# Patient Record
Sex: FEMALE | Race: WHITE | URBAN - METROPOLITAN AREA
[De-identification: names, ages, dates, MRNs, and addresses within clinical notes are randomized per-mention and may not be internally consistent; named-entity substitution may affect disease eponyms.]

---

## 2017-03-06 RX ORDER — FLUOXETINE HYDROCHLORIDE 20 MG/1
CAPSULE ORAL
Qty: 30 CAP | Refills: 3 | Status: SHIPPED | OUTPATIENT
Start: 2017-03-06 | End: 2018-04-10 | Stop reason: ALTCHOICE

## 2017-03-14 ENCOUNTER — OFFICE VISIT (OUTPATIENT)
Dept: FAMILY MEDICINE CLINIC | Age: 37
End: 2017-03-14

## 2017-03-14 DIAGNOSIS — J01.90 ACUTE SINUSITIS, RECURRENCE NOT SPECIFIED, UNSPECIFIED LOCATION: Primary | ICD-10-CM

## 2017-03-14 DIAGNOSIS — F39 MOOD DISORDER (HCC): ICD-10-CM

## 2017-03-14 RX ORDER — CEFDINIR 300 MG/1
300 CAPSULE ORAL 2 TIMES DAILY
Qty: 20 CAP | Refills: 0 | Status: SHIPPED | OUTPATIENT
Start: 2017-03-14 | End: 2017-03-24

## 2017-03-14 RX ORDER — ARIPIPRAZOLE 2 MG/1
2 TABLET ORAL DAILY
Qty: 30 TAB | Refills: 1 | Status: SHIPPED | OUTPATIENT
Start: 2017-03-14 | End: 2018-04-10 | Stop reason: ALTCHOICE

## 2017-03-14 RX ORDER — HYDROCODONE POLISTIREX AND CHLORPHENIRAMINE POLISTIREX 10; 8 MG/5ML; MG/5ML
5 SUSPENSION, EXTENDED RELEASE ORAL
Qty: 120 ML | Refills: 0 | Status: SHIPPED | OUTPATIENT
Start: 2017-03-14 | End: 2017-05-19 | Stop reason: ALTCHOICE

## 2017-03-14 RX ORDER — METHYLPREDNISOLONE 4 MG/1
TABLET ORAL
Qty: 1 DOSE PACK | Refills: 0 | Status: SHIPPED | OUTPATIENT
Start: 2017-03-14 | End: 2018-04-10 | Stop reason: ALTCHOICE

## 2017-03-14 NOTE — MR AVS SNAPSHOT
Visit Information Date & Time Provider Department Dept. Phone Encounter #  
 3/14/2017  2:15 PM Patricia Quispe, 3 [facility] [phone] [id number] Follow-up Instructions Return if symptoms worsen or fail to improve. Follow-up and Disposition History Upcoming Health Maintenance Date Due DTaP/Tdap/Td series (1 - Tdap) 8/2/2003 INFLUENZA AGE 9 TO ADULT 8/1/2016 PAP AKA CERVICAL CYTOLOGY 5/2/2019 Allergies as of 3/14/2017  Review Complete On: 5/2/2016 By: Silke Thacker, DO No Known Allergies Current Immunizations  Never Reviewed No immunizations on file. Not reviewed this visit You Were Diagnosed With   
  
 Codes Comments Acute sinusitis, recurrence not specified, unspecified location    -  Primary ICD-10-CM: J01.90 ICD-9-CM: 461.9 Mood disorder ([facility]     ICD-10-CM: F39 
ICD-9-CM: 296.90 Vitals OB Status Smoking Status Having regular periods Current Every Day Smoker Preferred Pharmacy Pharmacy Name Phone [phone], 1 [facility] [phone] Your Updated Medication List  
  
   
This list is accurate as of: 3/14/17 11:59 PM.  Always use your most recent med list.  
  
  
  
  
 ARIPiprazole 2 mg tablet Commonly known as:  ABILIFY Take 1 Tab by mouth daily. Brompheniramine-Pseudoeph-DM 2-30-10 mg/5 mL syrup Commonly known as:  BROMFED DM Take 5 mL by mouth four (4) times daily as needed. cefdinir 300 mg capsule Commonly known as:  OMNICEF Take 1 Cap by mouth two (2) times a day for 10 days. chlorpheniramine-HYDROcodone 10-8 mg/5 mL suspension Commonly known as:  Zygmunt Guardian Take 5 mL by mouth every twelve (12) hours as needed for Cough. Max Daily Amount: 10 mL. * FLUoxetine 20 mg capsule Commonly known as:  PROzac TAKE ONE CAPSULE BY MOUTH ONCE DAILY * FLUoxetine 20 mg capsule Commonly known as:  PROzac Take 1 Cap by mouth daily. * FLUoxetine 20 mg capsule Commonly known as:  PROzac  
take 1 capsule by mouth once daily  
  
 ibuprofen 600 mg tablet Commonly known as:  MOTRIN Take 1 Tab by mouth every six (6) hours as needed for Pain.  
  
 levonorgestrel-ethinyl estradiol 0.15-0.03 mg Tab Commonly known as:  NORDETTE  
take 1 tablet by mouth once daily LORazepam 0.5 mg tablet Commonly known as:  ATIVAN Take 1 Tab by mouth every eight (8) hours as needed for Anxiety. Max Daily Amount: 1.5 mg.  
  
 * methylPREDNISolone 4 mg tablet Commonly known as:  Shayan Hasmilton Start Tuesday as directed * methylPREDNISolone 4 mg tablet Commonly known as:  Shayan Hasting Take as directed  
  
 mupirocin calcium 2 % nasal ointment Commonly known as:  BACTROBAN  
by Both Nostrils route two (2) times a day. nitrofurantoin (macrocrystal-monohydrate) 100 mg capsule Commonly known as:  MACROBID Take 1 capsule by mouth two (2) times a day. norgestimate-ethinyl estradiol 0.25-35 mg-mcg Tab Commonly known as:  [facility] () Take 1 Tab by mouth daily. PSEUDOEPHEDRINE-guaiFENesin  mg per tablet Commonly known as:  Zan & Zan D Take 1 Tab by mouth every twelve (12) hours. sucralfate 1 gram tablet Commonly known as:  Hortencia Kirks Take 1 g by mouth four (4) times daily. valACYclovir 500 mg tablet Commonly known as:  VALTREX Take 4 tablets  12 hours apart today, then 4 tablets 12 hours apart at the first sign of fever blisters in the future * Notice: This list has 5 medication(s) that are the same as other medications prescribed for you. Read the directions carefully, and ask your doctor or other care provider to review them with you. Follow-up Instructions Return if symptoms worsen or fail to improve. Introducing [facility]! [employer] introduces [employer] patient portal. Now you can access parts of your medical record, email your doctor's office, and request medication refills online. 1. In your internet browser, go to [email or web address] 2. Click on the First Time User? Click Here link in the Sign In box. You will see the New Member Sign Up page. 3. Enter your [employer] Access Code exactly as it appears below. You will not need to use this code after youve completed the sign-up process. If you do not sign up before the expiration date, you must request a new code. · [employer] Access Code: [email or web address] Expires: 6/13/2017 12:30 PM 
 
4. Enter the last four digits of your Social Security Number (xxxx) and Date of Birth (mm/dd/yyyy) as indicated and click Submit. You will be taken to the next sign-up page. 5. Create a [employer] ID. This will be your [employer] login ID and cannot be changed, so think of one that is secure and easy to remember. 6. Create a [employer] password. You can change your password at any time. 7. Enter your Password Reset Question and Answer. This can be used at a later time if you forget your password. 8. Enter your e-mail address. You will receive e-mail notification when new information is available in [address] 9. Click Sign Up. You can now view and download portions of your medical record. 10. Click the Download Summary menu link to download a portable copy of your medical information. If you have questions, please visit the Frequently Asked Questions section of the [employer] website. Remember, [employer] is NOT to be used for urgent needs. For medical emergencies, dial 911. Now available from your iPhone and Android! Please provide this summary of care documentation to your next provider. Your primary care clinician is listed as Satya Rizo. If you have any questions after today's visit, please call [phone].

## 2017-03-15 NOTE — PROGRESS NOTES
HISTORY OF PRESENT ILLNESS  Linda Romo is a 31 y.o. female. HPI   uri > cough x 2 weeks  A/w productive sputum, ear congestion  C/o mood disorder not relieved with prozac  Review of Systems   HENT: Positive for congestion and ear pain. Respiratory: Positive for cough. Psychiatric/Behavioral: The patient is nervous/anxious. All other systems reviewed and are negative. Past Medical History:   Diagnosis Date    Near syncope     Otalgia     Tobacco abuse      Current Outpatient Prescriptions on File Prior to Visit   Medication Sig Dispense Refill    ARIPiprazole (ABILIFY) 2 mg tablet Take 1 Tab by mouth daily. 30 Tab 1    methylPREDNISolone (MEDROL DOSEPACK) 4 mg tablet Take as directed 1 Dose Pack 0    chlorpheniramine-HYDROcodone (TUSSIONEX) 10-8 mg/5 mL suspension Take 5 mL by mouth every twelve (12) hours as needed for Cough. Max Daily Amount: 10 mL. 120 mL 0    cefdinir (OMNICEF) 300 mg capsule Take 1 Cap by mouth two (2) times a day for 10 days. 20 Cap 0    FLUoxetine (PROZAC) 20 mg capsule take 1 capsule by mouth once daily 30 Cap 3    FLUoxetine (PROZAC) 20 mg capsule Take 1 Cap by mouth daily. 30 Cap 0    FLUoxetine (PROZAC) 20 mg capsule TAKE ONE CAPSULE BY MOUTH ONCE DAILY 30 Cap 0    norgestimate-ethinyl estradiol (SPRINTEC, 28,) 0.25-35 mg-mcg tab Take 1 Tab by mouth daily. 84 Tab 3    methylPREDNISolone (MEDROL DOSEPACK) 4 mg tablet Start Tuesday as directed 1 Dose Pack 0    PSEUDOEPHEDRINE-guaiFENesin (MUCINEX D)  mg per tablet Take 1 Tab by mouth every twelve (12) hours. 20 Tab 0    LORazepam (ATIVAN) 0.5 mg tablet Take 1 Tab by mouth every eight (8) hours as needed for Anxiety.  Max Daily Amount: 1.5 mg. 30 Tab 1    valACYclovir (VALTREX) 500 mg tablet Take 4 tablets  12 hours apart today, then 4 tablets 12 hours apart at the first sign of fever blisters in the future 30 Tab 2    Brompheniramine-Pseudoeph-DM (BROMFED DM) 2-30-10 mg/5 mL syrup Take 5 mL by mouth four (4) times daily as needed. 120 mL 1    sucralfate (CARAFATE) 1 gram tablet Take 1 g by mouth four (4) times daily.  nitrofurantoin, macrocrystal-monohydrate, (MACROBID) 100 mg capsule Take 1 capsule by mouth two (2) times a day. 14 capsule 0    mupirocin calcium (BACTROBAN) 2 % nasal ointment by Both Nostrils route two (2) times a day. 1 g 0    levonorgestrel-ethinyl estradiol (NORDETTE) 0.15-30 mg-mcg per tablet take 1 tablet by mouth once daily 30 tablet 6    ibuprofen (MOTRIN) 600 mg tablet Take 1 Tab by mouth every six (6) hours as needed for Pain. 30 Tab 0     No current facility-administered medications on file prior to visit. There were no vitals taken for this visit. Physical Exam   Constitutional: She appears well-developed and well-nourished. No distress. HENT:   Head: Normocephalic and atraumatic. Left Ear: External ear normal.   Mouth/Throat: No oropharyngeal exudate. Tm dull on right   Pulmonary/Chest: Effort normal and breath sounds normal. No respiratory distress. She has no wheezes. She has no rales. She exhibits no tenderness. Skin: She is not diaphoretic. Psychiatric: She has a normal mood and affect. Her behavior is normal. Judgment and thought content normal.   Vitals reviewed.       ASSESSMENT and PLAN  acute sinusitis   Mood disorder  Plan  Cefdinir with probiotic  Tussionex  Start abilify 2 mg  Refer to [facility]

## 2017-04-04 RX ORDER — NORGESTIMATE AND ETHINYL ESTRADIOL 0.25-0.035
KIT ORAL
Qty: 3 PACKAGE | Refills: 3 | Status: SHIPPED | OUTPATIENT
Start: 2017-04-04 | End: 2018-02-28 | Stop reason: SDUPTHER

## 2017-05-03 ENCOUNTER — TELEPHONE (OUTPATIENT)
Dept: FAMILY MEDICINE CLINIC | Age: 37
End: 2017-05-03

## 2017-05-03 DIAGNOSIS — Z00.00 ROUTINE GENERAL MEDICAL EXAMINATION AT A HEALTH CARE FACILITY: ICD-10-CM

## 2017-05-03 DIAGNOSIS — Z00.00 ROUTINE GENERAL MEDICAL EXAMINATION AT A HEALTH CARE FACILITY: Primary | ICD-10-CM

## 2017-05-03 NOTE — TELEPHONE ENCOUNTER
Patient is asking for a lab order before her actual appointment.  Patient has an upcoming appointment for  CPE on 5/19/17 at 3:30pm.

## 2017-05-18 ENCOUNTER — HOSPITAL ENCOUNTER (OUTPATIENT)
Dept: LAB | Age: 37
Discharge: HOME | End: 2017-05-18

## 2017-05-18 PROCEDURE — 99001 SPECIMEN HANDLING PT-LAB: CPT | Performed by: INTERNAL MEDICINE

## 2017-05-19 ENCOUNTER — OFFICE VISIT (OUTPATIENT)
Dept: FAMILY MEDICINE CLINIC | Age: 37
End: 2017-05-19

## 2017-05-19 VITALS
HEIGHT: 61 IN | BODY MASS INDEX: 23.6 KG/M2 | TEMPERATURE: 97.2 F | HEART RATE: 87 BPM | SYSTOLIC BLOOD PRESSURE: 115 MMHG | DIASTOLIC BLOOD PRESSURE: 78 MMHG | RESPIRATION RATE: 16 BRPM | WEIGHT: 125 LBS | OXYGEN SATURATION: 97 %

## 2017-05-19 DIAGNOSIS — R00.2 HEART PALPITATIONS: ICD-10-CM

## 2017-05-19 DIAGNOSIS — R07.0 THROAT PAIN IN ADULT: ICD-10-CM

## 2017-05-19 DIAGNOSIS — M54.32 LEFT SIDED SCIATICA: ICD-10-CM

## 2017-05-19 DIAGNOSIS — R07.9 CHEST PAIN, UNSPECIFIED TYPE: ICD-10-CM

## 2017-05-19 DIAGNOSIS — Z01.419 ROUTINE GYNECOLOGICAL EXAMINATION: ICD-10-CM

## 2017-05-19 DIAGNOSIS — M75.41 SHOULDER IMPINGEMENT, RIGHT: ICD-10-CM

## 2017-05-19 DIAGNOSIS — Z00.00 ROUTINE GENERAL MEDICAL EXAMINATION AT A HEALTH CARE FACILITY: Primary | ICD-10-CM

## 2017-05-19 LAB
25(OH)D3+25(OH)D2 SERPL-MCNC: 39.6 NG/ML (ref 30–100)
ALBUMIN SERPL-MCNC: 4.1 G/DL (ref 3.5–5.5)
ALBUMIN/GLOB SERPL: 1.8 {RATIO} (ref 1.2–2.2)
ALP SERPL-CCNC: 47 IU/L (ref 39–117)
ALT SERPL-CCNC: 9 IU/L (ref 0–32)
AST SERPL-CCNC: 13 IU/L (ref 0–40)
BASOPHILS # BLD AUTO: 0.1 X10E3/UL (ref 0–0.2)
BASOPHILS NFR BLD AUTO: 1 %
BILIRUB SERPL-MCNC: <0.2 MG/DL (ref 0–1.2)
BUN SERPL-MCNC: 10 MG/DL (ref 6–20)
BUN/CREAT SERPL: 15 (ref 9–23)
CALCIUM SERPL-MCNC: 9 MG/DL (ref 8.7–10.2)
CHLORIDE SERPL-SCNC: 103 MMOL/L (ref 96–106)
CHOLEST SERPL-MCNC: 203 MG/DL (ref 100–199)
CO2 SERPL-SCNC: 20 MMOL/L (ref 18–29)
CREAT SERPL-MCNC: 0.68 MG/DL (ref 0.57–1)
EOSINOPHIL # BLD AUTO: 0.2 X10E3/UL (ref 0–0.4)
EOSINOPHIL NFR BLD AUTO: 2 %
ERYTHROCYTE [DISTWIDTH] IN BLOOD BY AUTOMATED COUNT: 13.1 % (ref 12.3–15.4)
GLOBULIN SER CALC-MCNC: 2.3 G/DL (ref 1.5–4.5)
GLUCOSE SERPL-MCNC: 80 MG/DL (ref 65–99)
HCT VFR BLD AUTO: 41.1 % (ref 34–46.6)
HDLC SERPL-MCNC: 69 MG/DL
HGB BLD-MCNC: 13.7 G/DL (ref 11.1–15.9)
IMM GRANULOCYTES # BLD: 0 X10E3/UL (ref 0–0.1)
IMM GRANULOCYTES NFR BLD: 0 %
INTERPRETATION, 910389: NORMAL
LDLC SERPL CALC-MCNC: 115 MG/DL (ref 0–99)
LYMPHOCYTES # BLD AUTO: 2.8 X10E3/UL (ref 0.7–3.1)
LYMPHOCYTES NFR BLD AUTO: 32 %
MCH RBC QN AUTO: 32.2 PG (ref 26.6–33)
MCHC RBC AUTO-ENTMCNC: 33.3 G/DL (ref 31.5–35.7)
MCV RBC AUTO: 97 FL (ref 79–97)
MONOCYTES # BLD AUTO: 0.5 X10E3/UL (ref 0.1–0.9)
MONOCYTES NFR BLD AUTO: 5 %
MORPHOLOGY BLD-IMP: NORMAL
NEUTROPHILS # BLD AUTO: 5.1 X10E3/UL (ref 1.4–7)
NEUTROPHILS NFR BLD AUTO: 60 %
PLATELET # BLD AUTO: 274 X10E3/UL (ref 150–379)
POTASSIUM SERPL-SCNC: 4.5 MMOL/L (ref 3.5–5.2)
PROT SERPL-MCNC: 6.4 G/DL (ref 6–8.5)
RBC # BLD AUTO: 4.26 X10E6/UL (ref 3.77–5.28)
SODIUM SERPL-SCNC: 141 MMOL/L (ref 134–144)
T4 FREE SERPL-MCNC: 1.13 NG/DL (ref 0.82–1.77)
TRIGL SERPL-MCNC: 95 MG/DL (ref 0–149)
TSH SERPL DL<=0.005 MIU/L-ACNC: 1.39 UIU/ML (ref 0.45–4.5)
VLDLC SERPL CALC-MCNC: 19 MG/DL (ref 5–40)
WBC # BLD AUTO: 8.6 X10E3/UL (ref 3.4–10.8)

## 2017-05-19 NOTE — LETTER
5/26/2017 1:51 PM 
 
Ms. [address] [facility] [address] [facility] [address] [facility] [address] [address] [facility]: 
 
Please find your most recent results below. Resulted Orders PAP (IMAGE GUIDED), LIQUID-BASED Result Value Ref Range Diagnosis Comment (A) Comment:  
   EPITHELIAL CELL ABNORMALITY. ATYPICAL SQUAMOUS CELLS OF UNDETERMINED SIGNIFICANCE. Specimen adequacy Comment Comment:  
   Satisfactory for evaluation. No endocervical component is identified. Clinician provided ICD10 Comment Comment:  
   Z01.419 Performed by: Comment Comment:  
   Georgi Aragon, Cytotechnologist (ASCP) Electronically signed by: Nina Singh Comment:  
   Alyssa Guzmán MD, Pathologist  
 . . Pathologist provided [facility] Comment:  
   R87.610 Note: Comment Comment: The Pap smear is a screening test designed to aid in the detection of 
premalignant and malignant conditions of the uterine cervix. It is not a 
diagnostic procedure and should not be used as the sole means of detecting 
cervical cancer. Both false-positive and false-negative reports do occur. Test methodology Comment Comment: This liquid based ThinPrep(R) pap test was screened with the 
use of an image guided system. Narrative Specimen Comment: [id number] Specimen Comment: LMP / Prev Treat. Chales Minus Chales Minus [id number] Specimen Comment: No. of containers. Chales Minus 01 CYTYC Thin Prep Vial 
Performed at:  [facility] [address]  [id number] : Tasha Merino MD, Phone:  [phone] Performed at:  [address] 
[address]  [id number] : Tasha Merino MD, Phone:  [phone] CHLAMYDIA/GC AMPLIFICATON THINPREP Result Value Ref Range Chlamydia trachomatis, MYNOR Negative Negative Neisseria gonorrhoeae, MYNOR Negative Negative Narrative Performed at:  [address]  [id number] : Mounika Warren MD, Phone:  [phone] RECOMMENDATIONS: 
Call, pap unremarkable, recheck in 1 year Please call me if you have any questions: [phone] Sincerely, Janet Milner MD

## 2017-05-19 NOTE — PROGRESS NOTES
Subjective:   31 y.o. female for Well Woman Check. Her gyne and breast care is done elsewhere by her Ob-Gyne physician. Patient Active Problem List   Diagnosis Code    Otalgia 388.7    Odynophagia R13.10    Tobacco abuse Z72.0    Alcohol abuse F10.10    Chronic pharyngitis J31.2     Patient Active Problem List    Diagnosis Date Noted    Odynophagia 07/20/2014    Tobacco abuse 07/20/2014    Alcohol abuse 07/20/2014    Chronic pharyngitis 07/20/2014    Otalgia      Current Outpatient Prescriptions   Medication Sig Dispense Refill    FLUoxetine (PROZAC) 20 mg capsule take 1 capsule by mouth once daily 30 Cap 3    FLUoxetine (PROZAC) 20 mg capsule Take 1 Cap by mouth daily. 30 Cap 0    FLUoxetine (PROZAC) 20 mg capsule TAKE ONE CAPSULE BY MOUTH ONCE DAILY 30 Cap 0    levonorgestrel-ethinyl estradiol (NORDETTE) 0.15-30 mg-mcg per tablet take 1 tablet by mouth once daily 30 tablet 6    ibuprofen (MOTRIN) 600 mg tablet Take 1 Tab by mouth every six (6) hours as needed for Pain. [address] [place] [address], , 0.25-35 mg-mcg tab take 1 tablet by mouth once daily 3 Package 3    ARIPiprazole (ABILIFY) 2 mg tablet Take 1 Tab by mouth daily. 30 Tab 1    methylPREDNISolone (MEDROL DOSEPACK) 4 mg tablet Take as directed 1 Dose Pack 0    methylPREDNISolone (MEDROL DOSEPACK) 4 mg tablet Start Tuesday as directed 1 Dose Pack 0    LORazepam (ATIVAN) 0.5 mg tablet Take 1 Tab by mouth every eight (8) hours as needed for Anxiety. Max Daily Amount: 1.5 mg. 30 Tab 1    valACYclovir (VALTREX) 500 mg tablet Take 4 tablets  12 hours apart today, then 4 tablets 12 hours apart at the first sign of fever blisters in the future 30 Tab 2    sucralfate (CARAFATE) 1 gram tablet Take 1 g by mouth four (4) times daily.  mupirocin calcium (BACTROBAN) 2 % nasal ointment by Both Nostrils route two (2) times a day.  1 g 0     No Known Allergies  Past Medical History:   Diagnosis Date    Anxiety     Depression     Near syncope     Otalgia     Tobacco abuse      Past Surgical History:   Procedure Laterality Date    HX LEEP PROCEDURE  02/2009     Family History   Problem Relation Age of Onset    Other Mother      uterine polyps    COPD Mother      Social History   Substance Use Topics    Smoking status: Current Every Day Smoker     Packs/day: 1.00     Types: Cigarettes    Smokeless tobacco: Never Used    Alcohol use No      Comment: 375ml/day        Lab Results  Component Value Date/Time   WBC 8.6 05/18/2017 07:25 AM   HGB 13.7 05/18/2017 07:25 AM   HCT 41.1 05/18/2017 07:25 AM   PLATELET 066 38/77/8512 07:25 AM   MCV 97 05/18/2017 07:25 AM       Lab Results  Component Value Date/Time   Glucose 80 05/18/2017 07:25 AM   LDL, calculated 115 05/18/2017 07:25 AM   Creatinine 0.68 05/18/2017 07:25 AM      Lab Results  Component Value Date/Time   Cholesterol, total 203 05/18/2017 07:25 AM   HDL Cholesterol 69 05/18/2017 07:25 AM   LDL, calculated 115 05/18/2017 07:25 AM   Triglyceride 95 05/18/2017 07:25 AM       Lab Results  Component Value Date/Time   ALT (SGPT) 9 05/18/2017 07:25 AM   AST (SGOT) 13 05/18/2017 07:25 AM   Alk. phosphatase 47 05/18/2017 07:25 AM   Bilirubin, total <0.2 05/18/2017 07:25 AM       Lab Results  Component Value Date/Time   GFR est  05/18/2017 07:25 AM   GFR est non- 05/18/2017 07:25 AM   Creatinine 0.68 05/18/2017 07:25 AM   BUN 10 05/18/2017 07:25 AM   Sodium 141 05/18/2017 07:25 AM   Potassium 4.5 05/18/2017 07:25 AM   Chloride 103 05/18/2017 07:25 AM   CO2 20 05/18/2017 07:25 AM      Lab Results  Component Value Date/Time   TSH 1.390 05/18/2017 07:25 AM   T4, Free 1.13 05/18/2017 07:25 AM      Lab Results   Component Value Date/Time    Glucose 80 05/18/2017 07:25 AM      Labs reviewed     Specific concerns today: chest pain.     Review of Systems  A comprehensive review of systems was negative except for: Cardiovascular: positive for chest pain, palpitations  Musculoskeletal: positive for r shoulder, l hip pain    Objective:   Blood pressure 115/78, pulse 87, temperature 97.2 °F (36.2 °C), temperature source Oral, resp. rate 16, height 5' 1\" (1.549 m), weight 125 lb (56.7 kg), SpO2 97 %.    Physical Examination:   General appearance - alert, well appearing, and in no distress, oriented to person, place, and time and well hydrated  Mental status - alert, oriented to person, place, and time, normal mood, behavior, speech, dress, motor activity, and thought processes  Eyes - pupils equal and reactive, extraocular eye movements intact, sclera anicteric  Mouth - mucous membranes moist, pharynx normal without lesions and tongue normal  Neck - supple, no significant adenopathy, carotids upstroke normal bilaterally, no bruits  Lymphatics - no palpable lymphadenopathy, no hepatosplenomegaly  Chest - clear to auscultation, no wheezes, rales or rhonchi, symmetric air entry  Heart - normal rate, regular rhythm, normal S1, S2, no murmurs, rubs, clicks or gallops  Abdomen - soft, nontender, nondistended, no masses or organomegaly  bowel sounds normal  no bladder distension noted  no abdominal bruits  no pulsatile masses  no CVA tenderness  no inguinal adenopathy  Breasts - breasts appear normal, no suspicious masses, no skin or nipple changes or axillary nodes  Pelvic - VULVA: normal appearing vulva with no masses, tenderness or lesions, VAGINA: normal appearing vagina with normal color and discharge, no lesions, CERVIX: normal appearing cervix without discharge or lesions, UTERUS: uterus is normal size, shape, consistency and nontender, ADNEXA: normal adnexa in size, nontender and no masses, PAP: Pap smear done today  Rectal - normal rectal, no masses  Back exam - full range of motion, no tenderness, palpable spasm or pain on motion, normal reflexes and strength bilateral lower extremities, sensory exam intact bilateral lower extremities  Neurological - alert, oriented, normal speech, no focal findings or movement disorder noted, screening mental status exam normal, neck supple without rigidity, cranial nerves II through XII intact, motor and sensory grossly normal bilaterally, normal muscle tone, no tremors, strength 5/5  Musculoskeletal - no joint tenderness, deformity or swelling, no muscular tenderness noted, full range of motion without pain  Extremities - peripheral pulses normal, no pedal edema, no clubbing or cyanosis, no pedal edema noted, intact peripheral pulses  Skin - normal coloration and turgor, no rashes, no suspicious skin lesions noted     Assessment/Plan:   Routine exam  Impingement r shoulder  ?  Facet syndrome  continue present plan  current treatment plan is effective, no change in therapy

## 2017-05-19 NOTE — MR AVS SNAPSHOT
Visit Information Date & Time Provider Department Dept. Phone Encounter #  
 5/19/2017  3:30 PM Dontae Gill MD 3 [facility] [phone] [id number] Follow-up Instructions Return for shoulder injection 30 minutes. Follow-up and Disposition History Upcoming Health Maintenance Date Due DTaP/Tdap/Td series (1 - Tdap) 8/2/2003 INFLUENZA AGE 9 TO ADULT 8/1/2017 PAP AKA CERVICAL CYTOLOGY 5/2/2019 Allergies as of 5/19/2017  Review Complete On: 5/19/2017 By: Dontae Gill MD  
 No Known Allergies Current Immunizations  Never Reviewed No immunizations on file. Not reviewed this visit You Were Diagnosed With   
  
 Codes Comments Routine general medical examination at a health care facility    -  Primary ICD-10-CM: Z00.00 ICD-9-CM: V70.0 Chest pain, unspecified type     ICD-10-CM: R07.9 ICD-9-CM: 786.50 Routine gynecological examination     ICD-10-CM: J71.898 ICD-9-CM: V72.31 Left sided sciatica     ICD-10-CM: M54.32 
ICD-9-CM: 724.3 Shoulder impingement, right     ICD-10-CM: M75.41 
ICD-9-CM: 726.2 Throat pain in adult     ICD-10-CM: R07.0 ICD-9-CM: 784.1 Heart palpitations     ICD-10-CM: R00.2 ICD-9-CM: 785.1 Vitals BP Pulse Temp Resp Height(growth percentile) Weight(growth percentile) 115/78 (BP 1 Location: Right arm, BP Patient Position: Sitting) 87 97.2 °F (36.2 °C) (Oral) 16 5' 1\" (1.549 m) 125 lb (56.7 kg) SpO2 BMI OB Status Smoking Status 97% 23.62 kg/m2 Having regular periods Current Every Day Smoker BMI and BSA Data Body Mass Index Body Surface Area  
 23.62 kg/m 2 1.56 m 2 Preferred Pharmacy Pharmacy Name Phone [address], [address] [facility] [phone] Your Updated Medication List  
  
   
This list is accurate as of: 5/19/17  4:41 PM.  Always use your most recent med list.  
  
  
  
  
 ARIPiprazole 2 mg tablet Commonly known as:  ABILIFY Take 1 Tab by mouth daily. * FLUoxetine 20 mg capsule Commonly known as:  PROzac TAKE ONE CAPSULE BY MOUTH ONCE DAILY * FLUoxetine 20 mg capsule Commonly known as:  PROzac Take 1 Cap by mouth daily. * FLUoxetine 20 mg capsule Commonly known as:  PROzac  
take 1 capsule by mouth once daily  
  
 ibuprofen 600 mg tablet Commonly known as:  MOTRIN Take 1 Tab by mouth every six (6) hours as needed for Pain.  
  
 levonorgestrel-ethinyl estradiol 0.15-0.03 mg Tab Commonly known as:  NORDETTE  
take 1 tablet by mouth once daily LORazepam 0.5 mg tablet Commonly known as:  ATIVAN Take 1 Tab by mouth every eight (8) hours as needed for Anxiety. Max Daily Amount: 1.5 mg.  
  
 * methylPREDNISolone 4 mg tablet Commonly known as:  Caye Ser Start Tuesday as directed * methylPREDNISolone 4 mg tablet Commonly known as:  Caye Ser Take as directed  
  
 mupirocin calcium 2 % nasal ointment Commonly known as:  BACTROBAN  
by Both Nostrils route two (2) times a day. [address] [place] [address] () 0.25-35 mg-mcg Tab Generic drug:  norgestimate-ethinyl estradiol  
take 1 tablet by mouth once daily  
  
 sucralfate 1 gram tablet Commonly known as:  Yong Pore Take 1 g by mouth four (4) times daily. valACYclovir 500 mg tablet Commonly known as:  VALTREX Take 4 tablets  12 hours apart today, then 4 tablets 12 hours apart at the first sign of fever blisters in the future * Notice: This list has 5 medication(s) that are the same as other medications prescribed for you. Read the directions carefully, and ask your doctor or other care provider to review them with you. We Performed the Following AMB POC EKG ROUTINE W/ 12 LEADS, INTER & REP [73201 CPT(R)] CHLAMYDIA/GC AMPLIFICATON THINPREP [[id number] Custom] PAP (IMAGE GUIDED), LIQUID-BASED [ULR1982 Custom] REFERRAL TO ENT-OTOLARYNGOLOGY [PTL69 Custom] Comments:  
 Please evaluate patient for throat pain. Follow-up Instructions Return for shoulder injection 30 minutes. To-Do List   
 05/19/2017 ECG:  CARDIAC HOLTER MONITOR, 24 HOURS   
  
 05/19/2017 Imaging:  XR SHOULDER RT AP/LAT MIN 2 V   
  
 05/19/2017 Imaging:  XR SPINE LUMB MIN 4 V Referral Information Referral ID Referred By Referred To  
  
 [id number] Isidro Angeles MD   
   [address] [facility] [address] Phone: [phone] Fax: [phone] Visits Status Start Date End Date 1 New Request 5/19/17 5/19/18 If your referral has a status of pending review or denied, additional information will be sent to support the outcome of this decision. Introducing [facility] & HEALTH SERVICES! Jose Luis Adams introduces Nippo patient portal. Now you can access parts of your medical record, email your doctor's office, and request medication refills online. 1. In your internet browser, go to [email or web address] 2. Click on the First Time User? Click Here link in the Sign In box. You will see the New Member Sign Up page. 3. Enter your Nippo Access Code exactly as it appears below. You will not need to use this code after youve completed the sign-up process. If you do not sign up before the expiration date, you must request a new code. · Nippo Access Code: [email or web address] Expires: 6/13/2017 12:30 PM 
 
4. Enter the last four digits of your Social Security Number (xxxx) and Date of Birth (mm/dd/yyyy) as indicated and click Submit. You will be taken to the next sign-up page. 5. Create a Nippo ID. This will be your Nippo login ID and cannot be changed, so think of one that is secure and easy to remember. 6. Create a Nippo password. You can change your password at any time. 7. Enter your Password Reset Question and Answer. This can be used at a later time if you forget your password. 8. Enter your e-mail address. You will receive e-mail notification when new information is available in [address] 9. Click Sign Up. You can now view and download portions of your medical record. 10. Click the Download Summary menu link to download a portable copy of your medical information. If you have questions, please visit the Frequently Asked Questions section of the Thryve website. Remember, Thryve is NOT to be used for urgent needs. For medical emergencies, dial 911. Now available from your iPhone and Android! Please provide this summary of care documentation to your next provider. Your primary care clinician is listed as Minervakapil Jones. If you have any questions after today's visit, please call [phone].

## 2017-05-19 NOTE — PROGRESS NOTES
Chief Complaint   Patient presents with    Complete Physical    Shoulder Pain     RT shoulder, pain scale 4/10    Hip Pain     Lt hip, pain scale 6/10    Knee Pain     Lt knee, pain scale 8/10    Chest Pain     patient stated she has been having chest pain and chest palpitations off and on for months, discomfort right now      pain scale 0/10     1. Have you been to the ER, urgent care clinic since your last visit? Hospitalized since your last visit? No    2. Have you seen or consulted any other health care providers outside of the [address] [place] [address] since your last visit? Include any pap smears or colon screening.  No

## 2017-05-23 ENCOUNTER — DOCUMENTATION ONLY (OUTPATIENT)
Dept: FAMILY MEDICINE CLINIC | Age: 37
End: 2017-05-23

## 2017-05-23 LAB
C TRACH RRNA SPEC QL NAA+PROBE: NEGATIVE
N GONORRHOEA RRNA SPEC QL NAA+PROBE: NEGATIVE

## 2017-05-23 NOTE — PROGRESS NOTES
Order for Holter monitor sent to [facility] scheduling fax at [phone]. Sent order, demographics sheet, copy of insurance card, and most recent office notes. Fax confirmation received.

## 2017-05-25 LAB
CYTOLOGIST CVX/VAG CYTO: ABNORMAL
CYTOLOGY CVX/VAG DOC THIN PREP: ABNORMAL
DX ICD CODE: ABNORMAL
DX ICD CODE: ABNORMAL
Lab: ABNORMAL
OTHER STN SPEC: ABNORMAL
PATH REPORT.FINAL DX SPEC: ABNORMAL
PATHOLOGIST CVX/VAG CYTO: ABNORMAL
STAT OF ADQ CVX/VAG CYTO-IMP: ABNORMAL

## 2017-06-13 ENCOUNTER — TELEPHONE (OUTPATIENT)
Dept: FAMILY MEDICINE CLINIC | Age: 37
End: 2017-06-13

## 2017-06-13 DIAGNOSIS — M54.16 LUMBAR RADICULOPATHY: Primary | ICD-10-CM

## 2017-06-13 NOTE — TELEPHONE ENCOUNTER
----- Message from Juan Francisco Torres LPN sent at 4/55/5302 11:45 AM EDT -----  Patient would like the MRI

## 2017-06-23 ENCOUNTER — DOCUMENTATION ONLY (OUTPATIENT)
Dept: FAMILY MEDICINE CLINIC | Age: 37
End: 2017-06-23

## 2017-06-26 NOTE — PROGRESS NOTES
Call placed to patient, gave resulted note, patient accepting. Patient states she is okay but still having weird pains, she thinks since its not the heart then maybe it can be her lungs since she has been smoking for 20 years.    Please advise

## 2017-06-28 DIAGNOSIS — R00.2 HEART PALPITATIONS: ICD-10-CM

## 2017-07-20 ENCOUNTER — TELEPHONE (OUTPATIENT)
Dept: FAMILY MEDICINE CLINIC | Age: 37
End: 2017-07-20

## 2017-07-20 DIAGNOSIS — B00.1 FEVER BLISTER: ICD-10-CM

## 2017-07-20 NOTE — TELEPHONE ENCOUNTER
Patient walked in the office asking a refill for Valtrex  500mg. Patient explain that she's breaking up that if she does not take it within 24 hours its going to spread on her chin. Patient wants her medication sent to [facility] on [address] [facility] [phone]. Asking to be called back.

## 2017-07-21 RX ORDER — VALACYCLOVIR HYDROCHLORIDE 1 G/1
TABLET, FILM COATED ORAL
Qty: 4 TAB | Refills: 2 | Status: SHIPPED | OUTPATIENT
Start: 2017-07-21 | End: 2022-04-01

## 2018-02-06 DIAGNOSIS — B00.1 FEVER BLISTER: ICD-10-CM

## 2018-02-06 NOTE — TELEPHONE ENCOUNTER
Pt states she has a break out and needs prescription right away (today if possible)  to prevent spreading and oozing

## 2018-02-07 RX ORDER — VALACYCLOVIR HYDROCHLORIDE 500 MG/1
TABLET, FILM COATED ORAL
Qty: 30 TAB | Refills: 2 | Status: SHIPPED | OUTPATIENT
Start: 2018-02-07

## 2018-02-28 RX ORDER — NORGESTIMATE AND ETHINYL ESTRADIOL 0.25-0.035
KIT ORAL
Qty: 3 PACKAGE | Refills: 3 | Status: SHIPPED | OUTPATIENT
Start: 2018-02-28 | End: 2019-02-03 | Stop reason: SDUPTHER

## 2018-04-10 ENCOUNTER — OFFICE VISIT (OUTPATIENT)
Dept: FAMILY MEDICINE CLINIC | Age: 38
End: 2018-04-10

## 2018-04-10 VITALS
RESPIRATION RATE: 18 BRPM | DIASTOLIC BLOOD PRESSURE: 82 MMHG | HEART RATE: 106 BPM | WEIGHT: 117 LBS | HEIGHT: 61 IN | SYSTOLIC BLOOD PRESSURE: 127 MMHG | BODY MASS INDEX: 22.09 KG/M2 | OXYGEN SATURATION: 99 % | TEMPERATURE: 97 F

## 2018-04-10 DIAGNOSIS — Z00.00 ROUTINE GENERAL MEDICAL EXAMINATION AT A HEALTH CARE FACILITY: ICD-10-CM

## 2018-04-10 DIAGNOSIS — R07.81 CHEST PAIN, PLEURITIC: ICD-10-CM

## 2018-04-10 DIAGNOSIS — M54.2 CERVICALGIA: ICD-10-CM

## 2018-04-10 DIAGNOSIS — Z72.0 TOBACCO ABUSE: Primary | ICD-10-CM

## 2018-04-10 DIAGNOSIS — M54.50 CHRONIC MIDLINE LOW BACK PAIN WITHOUT SCIATICA: ICD-10-CM

## 2018-04-10 DIAGNOSIS — G89.29 CHRONIC MIDLINE LOW BACK PAIN WITHOUT SCIATICA: ICD-10-CM

## 2018-04-10 PROBLEM — F32.A MILD DEPRESSION: Status: ACTIVE | Noted: 2018-04-10

## 2018-04-10 PROBLEM — F39 MOOD DISORDER (HCC): Status: ACTIVE | Noted: 2018-04-10

## 2018-04-10 PROBLEM — F39 MOOD DISORDER (HCC): Status: RESOLVED | Noted: 2018-04-10 | Resolved: 2018-04-10

## 2018-04-10 RX ORDER — VARENICLINE TARTRATE 25 MG
KIT ORAL
Qty: 1 DOSE PACK | Refills: 0 | Status: SHIPPED | OUTPATIENT
Start: 2018-04-10 | End: 2022-04-01

## 2018-04-10 RX ORDER — IBUPROFEN 800 MG/1
800 TABLET ORAL
Qty: 60 TAB | Refills: 1 | Status: SHIPPED | OUTPATIENT
Start: 2018-04-10 | End: 2022-04-01

## 2018-04-10 RX ORDER — VARENICLINE TARTRATE 1 MG/1
1 TABLET, FILM COATED ORAL 2 TIMES DAILY
Qty: 60 TAB | Refills: 2 | Status: SHIPPED | OUTPATIENT
Start: 2018-04-10 | End: 2022-04-01

## 2018-04-10 NOTE — PROGRESS NOTES
Scurrent treatment plan is effective, no change in therapy  ubjective:   30 y.o. female for Well Woman Check. Her gyne and breast care is done elsewhere by her Ob-Gyne physician. Patient Active Problem List   Diagnosis Code    Otalgia 388.7    Odynophagia R13.10    Tobacco abuse Z72.0    Alcohol abuse F10.10    Chronic pharyngitis J31.2    Mild depression ([facility] [address] [address] F32.0    Chronic midline low back pain without sciatica M54.5, G89.29     Patient Active Problem List    Diagnosis Date Noted    Mild depression ([address] 04/10/2018    Chronic midline low back pain without sciatica 04/10/2018    Odynophagia 07/20/2014    Tobacco abuse 07/20/2014    Alcohol abuse 07/20/2014    Chronic pharyngitis 07/20/2014    Otalgia      Current Outpatient Prescriptions   Medication Sig Dispense Refill    SPRINTEC, 28, 0.25-35 mg-mcg tab take 1 tablet by mouth once daily 3 Package 3    valACYclovir (VALTREX) 500 mg tablet Take 4 tablets  12 hours apart today, then 4 tablets 12 hours apart at the first sign of fever blisters in the future 30 Tab 2    valACYclovir (VALTREX) 1 gram tablet Take 2 tablets every 12 hours today and in the future at the first sign of a fever blister 4 Tab 2     No Known Allergies  Past Medical History:   Diagnosis Date    Anxiety     Depression     Mood disorder ([facility] [address] 4/10/2018    Near syncope     Otalgia     Tobacco abuse      Past Surgical History:   Procedure Laterality Date    HX LEEP PROCEDURE  02/2009     Family History   Problem Relation Age of Onset    Other Mother      uterine polyps    COPD Mother      Social History   Substance Use Topics    Smoking status: Current Every Day Smoker     Packs/day: 1.00     Types: Cigarettes    Smokeless tobacco: Never Used    Alcohol use No      Comment: 375ml/day        ordered     Specific concerns today: chest pain, low back pain, vaginal discomfort.     Review of Systems  A comprehensive review of systems was negative except for: Respiratory: positive for pleurisy/chest pain  Genitourinary: positive for dyspeurunia  Musculoskeletal: positive for neck pain and back pain    Objective:   Blood pressure 127/82, pulse (!) 106, temperature 97 °F (36.1 °C), temperature source Oral, resp. rate 18, height 5' 1\" (1.549 m), weight 117 lb (53.1 kg), SpO2 99 %.    Physical Examination:   General appearance - alert, well appearing, and in no distress, oriented to person, place, and time, normal appearing weight and well hydrated  Mental status - alert, oriented to person, place, and time, normal mood, behavior, speech, dress, motor activity, and thought processes  Eyes - pupils equal and reactive, extraocular eye movements intact, sclera anicteric  Mouth - mucous membranes moist, pharynx normal without lesions and tongue normal  Neck - supple, no significant adenopathy, carotids upstroke normal bilaterally, no bruits  Lymphatics - no palpable lymphadenopathy, no hepatosplenomegaly  Chest - clear to auscultation, no wheezes, rales or rhonchi, symmetric air entry  Heart - normal rate, regular rhythm, normal S1, S2, no murmurs, rubs, clicks or gallops  Abdomen - soft, nontender, nondistended, no masses or organomegaly  bowel sounds normal  no bladder distension noted  no abdominal bruits  no pulsatile masses  no CVA tenderness  no inguinal adenopathy  Back exam - full range of motion, no tenderness, palpable spasm or pain on motion, normal reflexes and strength bilateral lower extremities, sensory exam intact bilateral lower extremities  Neurological - alert, oriented, normal speech, no focal findings or movement disorder noted, screening mental status exam normal, neck supple without rigidity, cranial nerves II through XII intact, motor and sensory grossly normal bilaterally, normal muscle tone, no tremors, strength 5/5  Musculoskeletal - no joint tenderness, deformity or swelling, no muscular tenderness noted, full range of motion without pain  Extremities - peripheral pulses normal, no pedal edema, no clubbing or cyanosis, no pedal edema noted, intact peripheral pulses  Skin - normal coloration and turgor, no rashes, no suspicious skin lesions noted     Assessment/Plan:   Routine exam  routine labs ordered, x-rays  current treatment plan is effective, no change in therapy  smoking cessation advise   Refer to gyn  [facility], etc.

## 2018-04-10 NOTE — PROGRESS NOTES
Prieto Frost is a 30 y.o. female (: 1980) presenting to address:    Chief Complaint   Patient presents with   Sona Marroquin Physical    Chest Pain     pain scale 2/10       Vitals:    04/10/18 1548   BP: 127/82   Pulse: (!) 106   Resp: 18   Temp: 97 °F (36.1 °C)   TempSrc: Oral   SpO2: 99%   Weight: 117 lb (53.1 kg)   Height: 5' 1\" (1.549 m)   PainSc:   2   PainLoc: Chest       Hearing/Vision:   No exam data present    Learning Assessment:     Learning Assessment 2014   PRIMARY LEARNER Patient   HIGHEST LEVEL OF EDUCATION - PRIMARY LEARNER  GRADUATED HIGH SCHOOL OR GED   BARRIERS PRIMARY LEARNER NONE   PRIMARY LANGUAGE ENGLISH   LEARNER PREFERENCE PRIMARY DEMONSTRATION   ANSWERED BY patient   RELATIONSHIP SELF     Depression Screening:     PHQ over the last two weeks 4/10/2018   PHQ Not Done -   Little interest or pleasure in doing things Several days   Feeling down, depressed or hopeless Several days   Total Score PHQ 2 2     Fall Risk Assessment:   No flowsheet data found. Abuse Screening:     Abuse Screening Questionnaire 2014   Do you ever feel afraid of your partner? N   Are you in a relationship with someone who physically or mentally threatens you? N   Is it safe for you to go home? Y     Coordination of Care Questionaire:   1. Have you been to the ER, urgent care clinic since your last visit? Hospitalized since your last visit? NO    2. Have you seen or consulted any other health care providers outside of the [facility] since your last visit? Include any pap smears or colon screening. NO    Advanced Directive:   1. Do you have an Advanced Directive? NO    2. Would you like information on Advanced Directives?  NO

## 2018-04-10 NOTE — MR AVS SNAPSHOT
Cristina Kenney 
 
 
 [address] [facility] [id number] [phone] Patient: Enzo Poole MRN: [id number] IAQ:[birth year] Visit Information Date & Time Provider Department Dept. Phone Encounter #  
 4/10/2018  3:45 PM Areli Trent MD [facility] [phone] [id number] Follow-up Instructions Return pending results. Follow-up and Disposition History Upcoming Health Maintenance Date Due DTaP/Tdap/Td series (1 - Tdap) 8/2/2003 Influenza Age 5 to Adult 8/1/2017 PAP AKA CERVICAL CYTOLOGY 5/19/2020 Allergies as of 4/10/2018  Review Complete On: 4/10/2018 By: Areli Trent MD  
 No Known Allergies Current Immunizations  Never Reviewed No immunizations on file. Not reviewed this visit You Were Diagnosed With   
  
 Codes Comments Tobacco abuse    -  Primary ICD-10-CM: Z72.0 ICD-9-CM: 305.1 Chronic midline low back pain without sciatica     ICD-10-CM: M54.5, G89.29 ICD-9-CM: 724.2, 338.29 Chest pain, pleuritic     ICD-10-CM: R07.81 ICD-9-CM: 786.52 Routine general medical examination at a health care facility     ICD-10-CM: Z00.00 ICD-9-CM: V70.0 Cervicalgia     ICD-10-CM: M54.2 ICD-9-CM: 723.1 Vitals BP Pulse Temp Resp Height(growth percentile) Weight(growth percentile) 127/82 (BP 1 Location: Right arm, BP Patient Position: Sitting) (!) 106 97 °F (36.1 °C) (Oral) 18 5' 1\" (1.549 m) 117 lb (53.1 kg) SpO2 BMI OB Status Smoking Status 99% 22.11 kg/m2 Having regular periods Current Every Day Smoker BMI and BSA Data Body Mass Index Body Surface Area  
 22.11 kg/m 2 1.51 m 2 Preferred Pharmacy Pharmacy Name Phone [address], [address] [facility] [phone] Your Updated Medication List  
  
   
 This list is accurate as of 4/10/18  4:59 PM.  Always use your most recent med list.  
  
  
  
  
 [address] [facility] (28) 0.25-35 mg-mcg Tab Generic drug:  norgestimate-ethinyl estradiol  
take 1 tablet by mouth once daily * valACYclovir 1 gram tablet Commonly known as:  VALTREX Take 2 tablets every 12 hours today and in the future at the first sign of a fever blister * valACYclovir 500 mg tablet Commonly known as:  VALTREX Take 4 tablets  12 hours apart today, then 4 tablets 12 hours apart at the first sign of fever blisters in the future * varenicline 0.5 mg (11)- 1 mg (42) Dspk Commonly known as:  CHANTIX STARTER SEYMOUR Take as directed * varenicline 1 mg tablet Commonly known as:  Corey Began Take 1 Tab by mouth two (2) times a day. * Notice: This list has 4 medication(s) that are the same as other medications prescribed for you. Read the directions carefully, and ask your doctor or other care provider to review them with you. Prescriptions Printed Refills  
 varenicline (CHANTIX STARTER SEYMOUR) 0.5 mg (11)- 1 mg (42) DsPk 0 Sig: Take as directed Class: Print  
 varenicline (CHANTIX) 1 mg tablet 2 Sig: Take 1 Tab by mouth two (2) times a day. Class: Print Route: Oral  
  
Follow-up Instructions Return pending results. To-Do List   
 04/10/2018 Lab:  CBC WITH AUTOMATED DIFF   
  
 04/10/2018 Lab:  LIPID PANEL   
  
 04/10/2018 Lab:  METABOLIC PANEL, COMPREHENSIVE   
  
 04/10/2018 Lab:  T4, FREE   
  
 04/10/2018 Lab:  TSH 3RD GENERATION   
  
 04/10/2018 Lab:  VITAMIN D, 25 HYDROXY   
  
 04/10/2018 Imaging:  XR CHEST PA LAT   
  
 04/10/2018 Imaging:  XR SPINE CERV 4 OR 5 V   
  
 04/10/2018 Imaging:  XR SPINE LUMB MIN 4 V Introducing [facility] & HEALTH SERVICES!    
 [facility] introduces Ep[employer]yme patient portal. Now you can access parts of your medical record, email your doctor's office, and request medication refills online. 1. In your internet browser, go to [email or web address] 2. Click on the First Time User? Click Here link in the Sign In box. You will see the New Member Sign Up page. 3. Enter your Spinal Modulation Access Code exactly as it appears below. You will not need to use this code after youve completed the sign-up process. If you do not sign up before the expiration date, you must request a new code. · Spinal Modulation Access Code: [email or web address] Expires: 7/9/2018  4:59 PM 
 
4. Enter the last four digits of your Social Security Number (xxxx) and Date of Birth (mm/dd/yyyy) as indicated and click Submit. You will be taken to the next sign-up page. 5. Create a Spinal Modulation ID. This will be your Spinal Modulation login ID and cannot be changed, so think of one that is secure and easy to remember. 6. Create a Spinal Modulation password. You can change your password at any time. 7. Enter your Password Reset Question and Answer. This can be used at a later time if you forget your password. 8. Enter your e-mail address. You will receive e-mail notification when new information is available in [address] 9. Click Sign Up. You can now view and download portions of your medical record. 10. Click the Download Summary menu link to download a portable copy of your medical information. If you have questions, please visit the Frequently Asked Questions section of the Spinal Modulation website. Remember, Spinal Modulation is NOT to be used for urgent needs. For medical emergencies, dial 911. Now available from your iPhone and Android! Please provide this summary of care documentation to your next provider. Your primary care clinician is listed as Jessica Apodaca. If you have any questions after today's visit, please call [phone].

## 2018-11-15 NOTE — TELEPHONE ENCOUNTER
Pt is a [facility] patient. She is requesting to have her Sprintec refilled. I informed her that he is no longer in the practice and that she might need to be seen by a new doctor before the prescription would be filled.

## 2018-11-28 RX ORDER — NORGESTIMATE AND ETHINYL ESTRADIOL 0.25-0.035
KIT ORAL
Qty: 3 PACKAGE | Refills: 3 | OUTPATIENT
Start: 2018-11-28

## 2019-02-04 ENCOUNTER — TELEPHONE (OUTPATIENT)
Dept: OBGYN CLINIC | Age: 39
End: 2019-02-04

## 2019-02-04 RX ORDER — NORGESTIMATE AND ETHINYL ESTRADIOL 0.25-0.035
KIT ORAL
Qty: 1 PACKAGE | Refills: 0 | Status: SHIPPED | OUTPATIENT
Start: 2019-02-04 | End: 2022-03-15 | Stop reason: CLARIF

## 2019-02-04 NOTE — TELEPHONE ENCOUNTER
Patient notified of Refill that has been , pt instructed to schedule annual before any addiyonal refills can be sent

## 2021-07-20 ENCOUNTER — OFFICE VISIT (OUTPATIENT)
Dept: FAMILY MEDICINE CLINIC | Age: 41
End: 2021-07-20
Payer: MEDICAID

## 2021-07-20 VITALS
TEMPERATURE: 97.6 F | HEIGHT: 62 IN | RESPIRATION RATE: 16 BRPM | SYSTOLIC BLOOD PRESSURE: 122 MMHG | OXYGEN SATURATION: 95 % | BODY MASS INDEX: 26.13 KG/M2 | HEART RATE: 84 BPM | DIASTOLIC BLOOD PRESSURE: 75 MMHG | WEIGHT: 142 LBS

## 2021-07-20 DIAGNOSIS — D50.9 IRON DEFICIENCY ANEMIA, UNSPECIFIED IRON DEFICIENCY ANEMIA TYPE: ICD-10-CM

## 2021-07-20 DIAGNOSIS — B36.0 TINEA VERSICOLOR: ICD-10-CM

## 2021-07-20 DIAGNOSIS — F17.210 CIGARETTE SMOKER: ICD-10-CM

## 2021-07-20 DIAGNOSIS — E55.9 VITAMIN D DEFICIENCY: ICD-10-CM

## 2021-07-20 DIAGNOSIS — Z00.00 ROUTINE GENERAL MEDICAL EXAMINATION AT A HEALTH CARE FACILITY: Primary | ICD-10-CM

## 2021-07-20 DIAGNOSIS — F41.9 ANXIETY: ICD-10-CM

## 2021-07-20 DIAGNOSIS — B00.9 HSV-1 (HERPES SIMPLEX VIRUS 1) INFECTION: ICD-10-CM

## 2021-07-20 PROCEDURE — 99385 PREV VISIT NEW AGE 18-39: CPT | Performed by: NURSE PRACTITIONER

## 2021-07-20 PROCEDURE — 99406 BEHAV CHNG SMOKING 3-10 MIN: CPT | Performed by: NURSE PRACTITIONER

## 2021-07-20 RX ORDER — SALICYLIC ACID 0.03 G/ML
SHAMPOO TOPICAL
Qty: 473 ML | Refills: 3 | Status: SHIPPED | OUTPATIENT
Start: 2021-07-20 | End: 2022-03-15

## 2021-07-20 RX ORDER — VALACYCLOVIR HYDROCHLORIDE 500 MG/1
500 TABLET, FILM COATED ORAL DAILY
Qty: 30 TABLET | Refills: 3 | Status: SHIPPED | OUTPATIENT
Start: 2021-07-20 | End: 2022-03-15 | Stop reason: SDUPTHER

## 2021-07-20 RX ORDER — IBUPROFEN 200 MG
CAPSULE ORAL AS NEEDED
COMMUNITY

## 2021-07-20 RX ORDER — OMEPRAZOLE 40 MG/1
40 CAPSULE, DELAYED RELEASE ORAL 2 TIMES DAILY
COMMUNITY
Start: 2021-05-30 | End: 2022-03-15

## 2021-07-20 RX ORDER — ACETAMINOPHEN AND CODEINE PHOSPHATE 120; 12 MG/5ML; MG/5ML
0.35 SOLUTION ORAL DAILY
COMMUNITY
Start: 2021-05-02 | End: 2022-03-15 | Stop reason: CLARIF

## 2021-07-20 RX ORDER — PANTOPRAZOLE SODIUM 20 MG/1
20 TABLET, DELAYED RELEASE ORAL DAILY
COMMUNITY
Start: 2021-05-28 | End: 2022-03-15

## 2021-07-20 RX ORDER — VARENICLINE TARTRATE 25 MG
KIT ORAL
Qty: 1 DOSE PACK | Refills: 0 | Status: SHIPPED | OUTPATIENT
Start: 2021-07-20 | End: 2022-03-15 | Stop reason: SDUPTHER

## 2021-07-20 RX ORDER — LEVOCETIRIZINE DIHYDROCHLORIDE 5 MG/1
5 TABLET, FILM COATED ORAL
Qty: 30 TABLET | Refills: 3 | Status: SHIPPED | OUTPATIENT
Start: 2021-07-20 | End: 2022-03-15

## 2021-07-20 NOTE — PROGRESS NOTES
HPI  47 y.o. female is presenting for annual exam and complete physical.  Her gynecologic and breast care are done elsewhere by her OB/GYN physician. Most recent Pap smear: 2021  Prior Pap result: Normal.  Prior cervical/vaginal disease: Normal exam without visible pathology. Prior cervical treatment: no treatment. LMP/regularity    Specific concerns today:  1. would like to quit smoking - tried chantix in the past which helped, would like to go back on it    2. Has had issues with anxiety for a long time and has a fair amount of panic attacks. Has been given ativan in the past but would like to know if there is something she can take daily to help keep the anxiety at [facility] so she doesn't have to take those types of medications    3. Has tinea versicolor and has been using selsun blue which has been helping some but wants to know if there is anything else she can take for it to get rid of it since it is much more noticeable in the summer when she is more tan    4. Has a history of HSV and gets cold sores frequently on the same spot on her chin just to the right of her mouth. Was previously taking valtrex PRN but wants to know what she can do to control the breakouts better. Has been under a lot of stress recently.      Past Medical History:   Diagnosis Date    Anxiety     Depression     Mood disorder ([facility] [address] 4/10/2018    Near syncope     Otalgia     Tobacco abuse        Past Surgical History:   Procedure Laterality Date    HX LEEP PROCEDURE  02/2009       Family History   Problem Relation Age of Onset    Other Mother         uterine polyps    COPD Mother        Social History     Tobacco Use    Smoking status: Current Every Day Smoker     Packs/day: 1.00     Types: Cigarettes    Smokeless tobacco: Never Used   Substance Use Topics    Alcohol use: No     Alcohol/week: 0.0 standard drinks     Comment: 375ml/day    Drug use: No       Current Outpatient Medications on File Prior to Visit   Medication Sig Dispense Refill    norethindrone (MICRONOR) 0.35 mg tab Take 0.35 mg by mouth daily.  omeprazole (PRILOSEC) 40 mg capsule Take 40 Capsules by mouth two (2) times a day.  pantoprazole (PROTONIX) 20 mg tablet Take 20 mg by mouth daily. Alternates with Prilosec      ibuprofen 200 mg cap Take  by mouth as needed.  valACYclovir (VALTREX) 500 mg tablet Take 4 tablets  12 hours apart today, then 4 tablets 12 hours apart at the first sign of fever blisters in the future 30 Tab 2    MONO-LINYAH 0.25-35 mg-mcg tab take 1 tablet by mouth once daily (Patient not taking: Reported on 7/20/2021) 1 Package 0    varenicline (CHANTIX STARTER SEYMOUR) 0.5 mg (11)- 1 mg (42) DsPk Take as directed (Patient not taking: Reported on 7/20/2021) 1 Dose Pack 0    varenicline (CHANTIX) 1 mg tablet Take 1 Tab by mouth two (2) times a day. (Patient not taking: Reported on 7/20/2021) 60 Tab 2    ibuprofen (MOTRIN) 800 mg tablet Take 1 Tab by mouth every eight (8) hours as needed for Pain. (Patient not taking: Reported on 7/20/2021) 60 Tab 1    valACYclovir (VALTREX) 1 gram tablet Take 2 tablets every 12 hours today and in the future at the first sign of a fever blister (Patient not taking: Reported on 7/20/2021) 4 Tab 2     No current facility-administered medications on file prior to visit. No Known Allergies    Review of Systems   Constitutional: Negative for chills, fever, malaise/fatigue and weight loss. HENT: Negative for congestion, ear pain, hearing loss, sinus pain, sore throat and tinnitus. Eyes: Negative for blurred vision, double vision, photophobia and pain. Respiratory: Negative for cough and shortness of breath. Cardiovascular: Negative for chest pain, palpitations and leg swelling. Gastrointestinal: Negative for abdominal pain, constipation, diarrhea, heartburn, nausea and vomiting. Genitourinary: Negative for dysuria, frequency and urgency.    Musculoskeletal: Negative for back pain, joint pain and myalgias. Skin: Positive for rash (tinea versicolor). + HSV outbreaks right chin   Neurological: Negative for dizziness and headaches. Psychiatric/Behavioral: Negative for depression and suicidal ideas.        + anxiety             PE  /75   Pulse 84   Temp 97.6 °F (36.4 °C) (Temporal)   Resp 16   Ht 5' 1.5\" (1.562 m)   Wt 142 lb (64.4 kg)   LMP 07/11/2021   SpO2 95%   BMI 26.40 kg/m²      Physical Exam  Vitals reviewed. Constitutional:       General: She is not in acute distress. Appearance: Normal appearance. HENT:      Head: Normocephalic and atraumatic. Right Ear: Tympanic membrane, ear canal and external ear normal.      Left Ear: Tympanic membrane, ear canal and external ear normal.      Nose: Nose normal. No nasal deformity, congestion or rhinorrhea. Mouth/Throat:      Lips: Pink. No lesions. Mouth: Mucous membranes are moist. No oral lesions. Dentition: Normal dentition. Tongue: No lesions. Pharynx: Oropharynx is clear. Eyes:      General: Lids are normal.      Extraocular Movements: Extraocular movements intact. Conjunctiva/sclera: Conjunctivae normal.      Pupils: Pupils are equal, round, and reactive to light. Neck:      Thyroid: No thyroid mass, thyromegaly or thyroid tenderness. Cardiovascular:      Rate and Rhythm: Normal rate and regular rhythm. Pulses:           Dorsalis pedis pulses are 2+ on the right side and 2+ on the left side. Heart sounds: S1 normal and S2 normal. No murmur heard. No friction rub. No gallop. No S3 or S4 sounds. Pulmonary:      Effort: Pulmonary effort is normal.      Breath sounds: Normal breath sounds. No wheezing, rhonchi or rales. Abdominal:      General: Abdomen is flat. Bowel sounds are normal. There is no distension. Palpations: Abdomen is soft. There is no hepatomegaly, splenomegaly or mass. Tenderness: There is no abdominal tenderness.  There is no guarding or rebound. Musculoskeletal:      Cervical back: Full passive range of motion without pain. Right lower leg: No edema. Left lower leg: No edema. Lymphadenopathy:      Head:      Right side of head: No submental, submandibular, tonsillar, preauricular, posterior auricular or occipital adenopathy. Left side of head: No submental, submandibular, tonsillar, preauricular, posterior auricular or occipital adenopathy. Cervical: No cervical adenopathy. Upper Body:      Right upper body: No supraclavicular adenopathy. Left upper body: No supraclavicular adenopathy. Skin:     General: Skin is warm and dry. Capillary Refill: Capillary refill takes less than 2 seconds. Comments: No tinea versicolor noted   Neurological:      General: No focal deficit present. Mental Status: She is alert and oriented to person, place, and time. Cranial Nerves: Cranial nerves are intact. Sensory: Sensation is intact. Motor: Motor function is intact. Coordination: Coordination is intact. Gait: Gait is intact. Deep Tendon Reflexes:      Reflex Scores:       Patellar reflexes are 2+ on the right side and 2+ on the left side. Psychiatric:         Attention and Perception: Attention and perception normal.         Mood and Affect: Mood and affect normal.         Speech: Speech normal.         Behavior: Behavior normal.         Thought Content: Thought content normal.         Cognition and Memory: Cognition and memory normal.         Judgment: Judgment normal.         Assessment/Plan  1. Preventive care  Fasting labs  UTD pap    2. Smoking  Start chantix  Smoking cessation discussed for 10 minutes    3. Anxiety  Effexor 37.5 every day  Medication side effects reviewed and patient verbalized understanding    4. HSV  Valtrex 500 every day    5.  Tinea versicolor  Selsun blue RX 3%  Can also try ketoconazole shampoo

## 2021-07-20 NOTE — PROGRESS NOTES
Naveen Perrin is a 47 y.o. female (: 1980) presenting to address:    Chief Complaint   Patient presents with    Complete Physical       Vitals:    21 1511   BP: 122/75   Pulse: 84   Resp: 16   Temp: 97.6 °F (36.4 °C)   TempSrc: Temporal   SpO2: 95%   Weight: 142 lb (64.4 kg)   Height: 5' 1.5\" (1.562 m)   PainSc:   0 - No pain   LMP: 2021       Hearing/Vision:   No exam data present    Learning Assessment:     Learning Assessment 2014   PRIMARY LEARNER Patient   HIGHEST LEVEL OF EDUCATION - PRIMARY LEARNER  GRADUATED HIGH SCHOOL OR GED   BARRIERS PRIMARY LEARNER NONE   PRIMARY LANGUAGE ENGLISH   LEARNER PREFERENCE PRIMARY DEMONSTRATION   ANSWERED BY patient   RELATIONSHIP SELF     Depression Screening:     3 most recent PHQ Screens 2021   PHQ Not Done -   Little interest or pleasure in doing things Not at all   Feeling down, depressed, irritable, or hopeless Not at all   Total Score PHQ 2 0     Fall Risk Assessment:     Fall Risk Assessment, last 12 mths 2021   Able to walk? Yes   Fall in past 12 months? 0   Do you feel unsteady? 0   Are you worried about falling 0     Abuse Screening:     Abuse Screening Questionnaire 2021   Do you ever feel afraid of your partner? N   Are you in a relationship with someone who physically or mentally threatens you? N   Is it safe for you to go home? Y     Coordination of Care Questionaire:   1. Have you been to the ER, urgent care clinic since your last visit? Hospitalized since your last visit? NO    2. Have you seen or consulted any other health care providers outside of the [address] [place] [address] since your last visit? Include any pap smears or colon screening. NO    Advanced Directive:   1. Do you have an Advanced Directive? NO    2. Would you like information on Advanced Directives?  NO

## 2021-07-21 RX ORDER — VENLAFAXINE HYDROCHLORIDE 37.5 MG/1
37.5 CAPSULE, EXTENDED RELEASE ORAL DAILY
Qty: 30 CAPSULE | Refills: 2 | Status: SHIPPED | OUTPATIENT
Start: 2021-07-21 | End: 2022-03-15

## 2022-03-15 ENCOUNTER — OFFICE VISIT (OUTPATIENT)
Dept: FAMILY MEDICINE CLINIC | Age: 42
End: 2022-03-15
Payer: COMMERCIAL

## 2022-03-15 VITALS
DIASTOLIC BLOOD PRESSURE: 70 MMHG | WEIGHT: 151.4 LBS | BODY MASS INDEX: 27.86 KG/M2 | TEMPERATURE: 97.4 F | HEIGHT: 62 IN | RESPIRATION RATE: 15 BRPM | SYSTOLIC BLOOD PRESSURE: 112 MMHG | OXYGEN SATURATION: 99 % | HEART RATE: 100 BPM

## 2022-03-15 DIAGNOSIS — M77.11 LATERAL EPICONDYLITIS OF RIGHT ELBOW: Primary | ICD-10-CM

## 2022-03-15 PROCEDURE — 99213 OFFICE O/P EST LOW 20 MIN: CPT | Performed by: LEGAL MEDICINE

## 2022-03-15 RX ORDER — DROSPIRENONE 4 MG/1
4 TABLET, FILM COATED ORAL DAILY
COMMUNITY
Start: 2022-01-31

## 2022-03-15 NOTE — PROGRESS NOTES
[address] [facility]     Chief Complaint   Patient presents with    Elbow Pain     right elbow pain x 2-3 weeks     Vitals:    03/15/22 0811   BP: 112/70   Pulse: 100   Resp: 15   Temp: 97.4 °F (36.3 °C)   TempSrc: Temporal   SpO2: 99%   Weight: 151 lb 6.4 oz (68.7 kg)   Height: 5' 1.5\" (1.562 m)   PainSc:   8   PainLoc: Elbow         HPI: Mrs. Agusto Leavitt is here for right elbow pain for 3 weeks ,no swelling no major trauma only working typing ,writing and house work  , it has been getting worse she is not taking medication except for Tylenol or Advil when it is really bad no numbness no tingling no previous history of similar pain          Past Medical History:   Diagnosis Date    Anxiety     Depression     Mood disorder ([address] 4/10/2018    Near syncope     Otalgia     Tobacco abuse      Past Surgical History:   Procedure Laterality Date    HX LEEP PROCEDURE  02/2009     Social History     Tobacco Use    Smoking status: Current Every Day Smoker     Packs/day: 1.00     Types: Cigarettes    Smokeless tobacco: Never Used   Substance Use Topics    Alcohol use: No     Alcohol/week: 0.0 standard drinks     Comment: 375ml/day       Family History   Problem Relation Age of Onset    Other Mother         uterine polyps    COPD Mother        Review of Systems   Constitutional: Negative for chills, fever, malaise/fatigue and weight loss. HENT: Negative for congestion, ear discharge, ear pain, hearing loss and nosebleeds. Eyes: Negative for blurred vision, double vision and discharge. Respiratory: Negative for cough. Cardiovascular: Negative for chest pain, palpitations, claudication and leg swelling. Gastrointestinal: Negative for abdominal pain, constipation, diarrhea, nausea and vomiting. Genitourinary: Negative for dysuria, frequency and urgency. Musculoskeletal: Positive for joint pain and myalgias. Skin: Negative for itching and rash.    Neurological: Negative for dizziness, tingling, sensory change, speech change, focal weakness, weakness and headaches. Physical Exam  Vitals and nursing note reviewed. Constitutional:       General: She is not in acute distress. Appearance: She is well-developed. She is not diaphoretic. HENT:      Head: Normocephalic and atraumatic. Eyes:      General: No scleral icterus. Neck:      Thyroid: No thyromegaly. Cardiovascular:      Rate and Rhythm: Normal rate and regular rhythm. Chest:      Chest wall: Tenderness present. Musculoskeletal:         General: Tenderness present. No deformity. Normal range of motion. Comments: Tenderness in the right lateral epicondyles ligament no joint swelling, normal range of motion of the elbow joint she has pain on flexion   Lymphadenopathy:      Cervical: No cervical adenopathy. Skin:     General: Skin is warm and dry. Coloration: Skin is not pale. Findings: No bruising, erythema, lesion or rash. Neurological:      General: No focal deficit present. Mental Status: She is alert and oriented to person, place, and time. Cranial Nerves: No cranial nerve deficit. Coordination: Coordination normal.      Gait: Gait normal.   Psychiatric:         Mood and Affect: Mood normal.         Behavior: Behavior normal.         Thought Content: Thought content normal.         Judgment: Judgment normal.          Assessment and plan     Plan of care has been discussed with the patient, he agrees to the plan and verbalized understanding. All his questions were answered  More than 50% of the time spent in this visit was counseling the patient about  illness and treatment options         1.  Lateral epicondylitis of right elbow    I have discussed with patient in length regarding the tendinitis, she need to wear tennis elbow brace and to take it off when she sleeps, she was Advil over-the-counter 200 mg daily for 7 days she can alternate with Tylenol, she is not able to take any time off from work, she need to significantly decrease household chores like cleaning and scrubbing dishes and vacuuming, no heavy lifting    If no improvement consider physical therapy/steroid injection    Current Outpatient Medications   Medication Sig Dispense Refill    Slynd 4 mg (28) tab Take 4 mg by mouth daily.  ibuprofen 200 mg cap Take  by mouth as needed.  valACYclovir (VALTREX) 500 mg tablet Take 4 tablets  12 hours apart today, then 4 tablets 12 hours apart at the first sign of fever blisters in the future 30 Tab 2    valACYclovir (VALTREX) 1 gram tablet Take 2 tablets every 12 hours today and in the future at the first sign of a fever blister 4 Tab 2    varenicline (CHANTIX STARTER SEYMOUR) 0.5 mg (11)- 1 mg (42) DsPk Take as directed (Patient not taking: Reported on 7/20/2021) 1 Dose Pack 0    varenicline (CHANTIX) 1 mg tablet Take 1 Tab by mouth two (2) times a day. (Patient not taking: Reported on 7/20/2021) 60 Tab 2    ibuprofen (MOTRIN) 800 mg tablet Take 1 Tab by mouth every eight (8) hours as needed for Pain. (Patient not taking: Reported on 7/20/2021) 60 Tab 1       Patient Active Problem List    Diagnosis Date Noted    Mild depression ([facility] [address] 04/10/2018    Chronic midline low back pain without sciatica 04/10/2018    Odynophagia 07/20/2014    Tobacco abuse 07/20/2014    Alcohol abuse 07/20/2014    Chronic pharyngitis 07/20/2014    Otalgia      Results for orders placed or performed in visit on 05/19/17   PAP (IMAGE GUIDED), LIQUID-BASED   Result Value Ref Range    Diagnosis Comment (A)     Specimen adequacy Comment     Clinician provided ICD10 Comment     Performed by: Comment     Electronically signed by: Lolly Law      Pathologist provided ICD10 Comment     Note: Comment     Test methodology Comment    CHLAMYDIA/GC AMPLIFICATON THINPREP   Result Value Ref Range    Chlamydia trachomatis, MYNOR Negative Negative    Neisseria gonorrhoeae, MYNOR Negative Negative     No visits with results within 3 Month(s) from this visit. Latest known visit with results is:   Office Visit on 05/19/2017   Component Date Value Ref Range Status    Diagnosis 05/19/2017 Comment*  Final    Comment: EPITHELIAL CELL ABNORMALITY. ATYPICAL SQUAMOUS CELLS OF UNDETERMINED SIGNIFICANCE.  Specimen adequacy 05/19/2017 Comment   Final    Satisfactory for evaluation. No endocervical component is identified.  Clinician provided ICD10 05/19/2017 Comment   Final    Z01.419    Performed by: 05/19/2017 Comment   Final    Tara Biggs, Cytotechnologist (ASCP)    Electronically signed by: 05/19/2017 Comment   Final    Gardiner Simmonds, MD, Pathologist    . 05/19/2017 . Final    Pathologist provided ICD10 05/19/2017 Comment   Final    R87.610    Note: 05/19/2017 Comment   Final    Comment: The Pap smear is a screening test designed to aid in the detection of  premalignant and malignant conditions of the uterine cervix. It is not a  diagnostic procedure and should not be used as the sole means of detecting  cervical cancer. Both false-positive and false-negative reports do occur.  Test methodology 05/19/2017 Comment   Final    Comment: This liquid based ThinPrep(R) pap test was screened with the  use of an image guided system.  Chlamydia trachomatis, MYNOR 05/19/2017 Negative  Negative Final    Neisseria gonorrhoeae, MYNOR 05/19/2017 Negative  Negative Final          Follow-up and Dispositions    · Return if symptoms worsen or fail to improve.

## 2022-03-15 NOTE — PROGRESS NOTES
Varinder Medina is a 46 y.o. female (: 1980) presenting to address:    Chief Complaint   Patient presents with    Elbow Pain     right elbow pain x 2-3 weeks       Vitals:    03/15/22 0811   BP: 112/70   Pulse: 100   Resp: 15   Temp: 97.4 °F (36.3 °C)   TempSrc: Temporal   SpO2: 99%   Weight: 151 lb 6.4 oz (68.7 kg)   Height: 5' 1.5\" (1.562 m)   PainSc:   8   PainLoc: Elbow       Hearing/Vision:   No exam data present    Learning Assessment:     Learning Assessment 2014   PRIMARY LEARNER Patient   HIGHEST LEVEL OF EDUCATION - PRIMARY LEARNER  GRADUATED HIGH SCHOOL OR GED   BARRIERS PRIMARY LEARNER NONE   PRIMARY LANGUAGE ENGLISH   LEARNER PREFERENCE PRIMARY DEMONSTRATION   ANSWERED BY patient   RELATIONSHIP SELF     Depression Screening:     3 most recent PHQ Screens 3/15/2022   PHQ Not Done -   Little interest or pleasure in doing things Not at all   Feeling down, depressed, irritable, or hopeless Not at all   Total Score PHQ 2 0     Fall Risk Assessment:     Fall Risk Assessment, last 12 mths 2021   Able to walk? Yes   Fall in past 12 months? 0   Do you feel unsteady? 0   Are you worried about falling 0     Abuse Screening:     Abuse Screening Questionnaire 2021   Do you ever feel afraid of your partner? N   Are you in a relationship with someone who physically or mentally threatens you? N   Is it safe for you to go home? Y     ADL Assessment:   No flowsheet data found. Coordination of Care Questionaire:   1. \"Have you been to the ER, urgent care clinic since your last visit? Hospitalized since your last visit? \" No    2. \"Have you seen or consulted any other health care providers outside of the [address] [place] [address] since your last visit? \" No     3. For patients aged 39-70: Has the patient had a colonoscopy / FIT/ Cologuard? NA - based on age    If the patient is female:    4. For patients aged 41-77: Has the patient had a mammogram within the past 2 years?  NA - based on age or sex  See top three    5. For patients aged 21-65: Has the patient had a pap smear? Yes - no Care Gap present    Advanced Directive:   1. Do you have an Advanced Directive? NO    2. Would you like information on Advanced Directives?  NO

## 2022-03-18 PROBLEM — M54.50 CHRONIC MIDLINE LOW BACK PAIN WITHOUT SCIATICA: Status: ACTIVE | Noted: 2018-04-10

## 2022-03-18 PROBLEM — G89.29 CHRONIC MIDLINE LOW BACK PAIN WITHOUT SCIATICA: Status: ACTIVE | Noted: 2018-04-10

## 2022-03-19 PROBLEM — F32.A MILD DEPRESSION: Status: ACTIVE | Noted: 2018-04-10

## 2022-04-01 ENCOUNTER — OFFICE VISIT (OUTPATIENT)
Dept: FAMILY MEDICINE CLINIC | Age: 42
End: 2022-04-01
Payer: COMMERCIAL

## 2022-04-01 VITALS
DIASTOLIC BLOOD PRESSURE: 68 MMHG | WEIGHT: 153.2 LBS | OXYGEN SATURATION: 99 % | BODY MASS INDEX: 28.19 KG/M2 | HEIGHT: 62 IN | HEART RATE: 98 BPM | SYSTOLIC BLOOD PRESSURE: 116 MMHG | RESPIRATION RATE: 15 BRPM | TEMPERATURE: 97.7 F

## 2022-04-01 DIAGNOSIS — M77.11 LATERAL EPICONDYLITIS OF RIGHT ELBOW: Primary | ICD-10-CM

## 2022-04-01 DIAGNOSIS — E55.9 VITAMIN D DEFICIENCY: ICD-10-CM

## 2022-04-01 DIAGNOSIS — F17.210 CIGARETTE SMOKER: ICD-10-CM

## 2022-04-01 DIAGNOSIS — D50.9 IRON DEFICIENCY ANEMIA, UNSPECIFIED IRON DEFICIENCY ANEMIA TYPE: ICD-10-CM

## 2022-04-01 DIAGNOSIS — M25.512 LEFT SHOULDER PAIN, UNSPECIFIED CHRONICITY: ICD-10-CM

## 2022-04-01 DIAGNOSIS — E78.2 MIXED HYPERLIPIDEMIA: ICD-10-CM

## 2022-04-01 PROCEDURE — 99214 OFFICE O/P EST MOD 30 MIN: CPT | Performed by: LEGAL MEDICINE

## 2022-04-01 RX ORDER — VALACYCLOVIR HYDROCHLORIDE 1 G/1
1000 TABLET, FILM COATED ORAL 2 TIMES DAILY
COMMUNITY
End: 2022-07-15 | Stop reason: SDUPTHER

## 2022-04-01 RX ORDER — ERGOCALCIFEROL 1.25 MG/1
50000 CAPSULE ORAL
Qty: 12 CAPSULE | Refills: 1 | Status: SHIPPED | OUTPATIENT
Start: 2022-04-01 | End: 2022-06-30

## 2022-04-01 NOTE — PROGRESS NOTES
Maisha Zurita is a 46 y.o. female (: 1980) presenting to address:    Chief Complaint   Patient presents with    Transfer Of Care       Vitals:    22 1553   BP: 116/68   Pulse: 98   Resp: 15   Temp: 97.7 °F (36.5 °C)   TempSrc: Temporal   SpO2: 99%   Weight: 153 lb 3.2 oz (69.5 kg)   Height: 5' 1.5\" (1.562 m)   PainSc:   0 - No pain       Hearing/Vision:   No exam data present    Learning Assessment:     Learning Assessment 2014   PRIMARY LEARNER Patient   HIGHEST LEVEL OF EDUCATION - PRIMARY LEARNER  GRADUATED HIGH SCHOOL OR GED   BARRIERS PRIMARY LEARNER NONE   PRIMARY LANGUAGE ENGLISH   LEARNER PREFERENCE PRIMARY DEMONSTRATION   ANSWERED BY patient   RELATIONSHIP SELF     Depression Screening:     3 most recent PHQ Screens 2022   PHQ Not Done -   Little interest or pleasure in doing things Not at all   Feeling down, depressed, irritable, or hopeless Not at all   Total Score PHQ 2 0     Fall Risk Assessment:     Fall Risk Assessment, last 12 mths 3/15/2022   Able to walk? Yes   Fall in past 12 months? 0   Do you feel unsteady? 0   Are you worried about falling 0     Abuse Screening:     Abuse Screening Questionnaire 3/15/2022   Do you ever feel afraid of your partner? N   Are you in a relationship with someone who physically or mentally threatens you? N   Is it safe for you to go home? Y     ADL Assessment:   No flowsheet data found. Coordination of Care Questionaire:   1. \"Have you been to the ER, urgent care clinic since your last visit? Hospitalized since your last visit? \" No    2. \"Have you seen or consulted any other health care providers outside of the [address] [place] [address] since your last visit? \" No     3. For patients aged 39-70: Has the patient had a colonoscopy / FIT/ Cologuard? NA - based on age    If the patient is female:    4. For patients aged 41-77: Has the patient had a mammogram within the past 2 years? NA - based on age or sex  See top three    5.  For patients aged 21-65: Has the patient had a pap smear? Yes - no Care Gap present    Advanced Directive:   1. Do you have an Advanced Directive? NO    2. Would you like information on Advanced Directives?  NO

## 2022-04-01 NOTE — PROGRESS NOTES
[facility]     Chief Complaint   Patient presents with    Transfer Of Care     Vitals:    04/01/22 1553   BP: 116/68   Pulse: 98   Resp: 15   Temp: 97.7 °F (36.5 °C)   TempSrc: Temporal   SpO2: 99%   Weight: 153 lb 3.2 oz (69.5 kg)   Height: 5' 1.5\" (1.562 m)   PainSc:   0 - No pain         HPI: is here for establishing care with new PCP   Still having right elbow pain that has been using tennis elbow brace and suing analgesics with no much help  For over 2 weeks     Pain under collar bone  for few days left side with no triggers , no numbness       Past Medical History:   Diagnosis Date    Anxiety     Depression     Mood disorder ([address] 4/10/2018    Near syncope     Otalgia     Tobacco abuse      Past Surgical History:   Procedure Laterality Date    HX LEEP PROCEDURE  02/2009     Social History     Tobacco Use    Smoking status: Current Every Day Smoker     Packs/day: 1.00     Types: Cigarettes    Smokeless tobacco: Never Used   Substance Use Topics    Alcohol use: No     Alcohol/week: 0.0 standard drinks     Comment: 375ml/day       Family History   Problem Relation Age of Onset    Other Mother         uterine polyps    COPD Mother        Review of Systems   Constitutional: Negative for chills, fever, malaise/fatigue and weight loss. HENT: Negative for congestion, ear discharge, ear pain, hearing loss, nosebleeds and sinus pain. Eyes: Negative for blurred vision, double vision and discharge. Respiratory: Negative for cough. Cardiovascular: Negative for chest pain, palpitations, claudication and leg swelling. Gastrointestinal: Negative for abdominal pain, constipation, diarrhea, nausea and vomiting. Genitourinary: Negative for dysuria, frequency and urgency. Musculoskeletal: Positive for joint pain and myalgias. Right elbow pain     Left shoulder pain    Skin: Negative for itching and rash.    Neurological: Negative for dizziness, tingling, sensory change, speech change, focal weakness, weakness and headaches. Physical Exam  Vitals and nursing note reviewed. Constitutional:       General: She is not in acute distress. Appearance: She is well-developed. She is not diaphoretic. HENT:      Head: Normocephalic and atraumatic. Mouth/Throat:      Pharynx: No oropharyngeal exudate. Eyes:      General: No scleral icterus. Right eye: No discharge. Left eye: No discharge. Conjunctiva/sclera: Conjunctivae normal.      Pupils: Pupils are equal, round, and reactive to light. Neck:      Thyroid: No thyromegaly. Cardiovascular:      Rate and Rhythm: Normal rate and regular rhythm. Heart sounds: Normal heart sounds. No murmur heard. Pulmonary:      Effort: Pulmonary effort is normal. No respiratory distress. Breath sounds: Normal breath sounds. No wheezing or rales. Chest:      Chest wall: No tenderness. Abdominal:      General: There is no distension. Palpations: Abdomen is soft. Tenderness: There is no abdominal tenderness. There is no rebound. Musculoskeletal:         General: Tenderness present. No deformity. Normal range of motion. Comments: Right elbow tendernrss  Right shoulder    Lymphadenopathy:      Cervical: No cervical adenopathy. Skin:     General: Skin is warm and dry. Coloration: Skin is not pale. Findings: No erythema or rash. Neurological:      Mental Status: She is alert and oriented to person, place, and time. Cranial Nerves: No cranial nerve deficit. Coordination: Coordination normal.   Psychiatric:         Behavior: Behavior normal.         Thought Content: Thought content normal.         Judgment: Judgment normal.          Assessment and plan     Plan of care has been discussed with the patient, he agrees to the plan and verbalized understanding.   All his questions were answered  More than 50% of the time spent in this visit was counseling the patient about  illness and treatment options         1. Lateral epicondylitis of right elbow  For steroid injection  - REFERRAL TO SPORTS MEDICINE    2. Vitamin D deficiency  Continue vitamin D    - ergocalciferol (ERGOCALCIFEROL) 1,250 mcg (50,000 unit) capsule; Take 1 Capsule by mouth every seven (7) days for 90 days. Dispense: 12 Capsule; Refill: 1    3. Iron deficiency anemia, unspecified iron deficiency anemia type      4. Mixed hyperlipidemia  Not on statin  Advised Lifestyle modification   - LIPID PANEL; Future  - METABOLIC PANEL, COMPREHENSIVE; Future    5. Cigarette smoker      6. Left shoulder pain, unspecified chronicity  ? Rotator cuff pain /inflamation  Consider PT if no improvement     Current Outpatient Medications   Medication Sig Dispense Refill    valACYclovir (VALTREX) 1 gram tablet Take 1,000 mg by mouth two (2) times a day.  ergocalciferol (ERGOCALCIFEROL) 1,250 mcg (50,000 unit) capsule Take 1 Capsule by mouth every seven (7) days for 90 days. 12 Capsule 1    Slynd 4 mg (28) tab Take 4 mg by mouth daily.  ibuprofen 200 mg cap Take  by mouth as needed.  valACYclovir (VALTREX) 500 mg tablet Take 4 tablets  12 hours apart today, then 4 tablets 12 hours apart at the first sign of fever blisters in the future 30 Tab 2       Patient Active Problem List    Diagnosis Date Noted    Mild depression 04/10/2018    Chronic midline low back pain without sciatica 04/10/2018    Odynophagia 07/20/2014    Tobacco abuse 07/20/2014    Alcohol abuse 07/20/2014    Chronic pharyngitis 07/20/2014    Otalgia      Results for orders placed or performed in visit on 05/19/17   PAP (IMAGE GUIDED), LIQUID-BASED   Result Value Ref Range    Diagnosis Comment (A)     Specimen adequacy Comment     Clinician provided ICD10 Comment     Performed by: Comment     Electronically signed by: Lolly     . Mendoza Wall      Pathologist provided ICD10 Comment     Note: Comment     Test methodology Comment    CHLAMYDIA/GC AMPLIFICATON THINPREP Result Value Ref Range    Chlamydia trachomatis, MYNOR Negative Negative    Neisseria gonorrhoeae, MYNOR Negative Negative     No visits with results within 3 Month(s) from this visit. Latest known visit with results is:   Office Visit on 05/19/2017   Component Date Value Ref Range Status    Diagnosis 05/19/2017 Comment*  Final    Comment: EPITHELIAL CELL ABNORMALITY. ATYPICAL SQUAMOUS CELLS OF UNDETERMINED SIGNIFICANCE.  Specimen adequacy 05/19/2017 Comment   Final    Satisfactory for evaluation. No endocervical component is identified.  Clinician provided ICD10 05/19/2017 Comment   Final    Z01.419    Performed by: 05/19/2017 Comment   Final    Antoine Wallace, Cytotechnologist (ASCP)    Electronically signed by: 05/19/2017 Comment   Final    Gennaro Clifford MD, Pathologist    . 05/19/2017 . Final    Pathologist provided ICD10 05/19/2017 Comment   Final    R87.610    Note: 05/19/2017 Comment   Final    Comment: The Pap smear is a screening test designed to aid in the detection of  premalignant and malignant conditions of the uterine cervix. It is not a  diagnostic procedure and should not be used as the sole means of detecting  cervical cancer. Both false-positive and false-negative reports do occur.  Test methodology 05/19/2017 Comment   Final    Comment: This liquid based ThinPrep(R) pap test was screened with the  use of an image guided system.       Chlamydia trachomatis, MYNOR 05/19/2017 Negative  Negative Final    Neisseria gonorrhoeae, MYNOR 05/19/2017 Negative  Negative Final          Follow-up and Dispositions    · Return in about 6 months (around 10/1/2022) for for annual physical.

## 2022-04-25 ENCOUNTER — TELEPHONE (OUTPATIENT)
Dept: FAMILY MEDICINE CLINIC | Age: 42
End: 2022-04-25

## 2022-04-25 NOTE — TELEPHONE ENCOUNTER
----- Message from Marina Perdue sent at 4/25/2022 12:02 PM EDT -----  Subject: Message to Provider    QUESTIONS  Information for Provider? Patient has [employer] Duty May 7th and wants to know   if she can get a letter for anxiety and depression. She feels she won't be   able to handle jury duty.  ---------------------------------------------------------------------------  --------------  [address]  What is the best way for the office to contact you? OK to leave message on   voicemail  Preferred Call Back Phone Number?  [phone]  ---------------------------------------------------------------------------  --------------  SCRIPT ANSWERS  undefined

## 2022-04-26 ENCOUNTER — VIRTUAL VISIT (OUTPATIENT)
Dept: FAMILY MEDICINE CLINIC | Age: 42
End: 2022-04-26
Payer: COMMERCIAL

## 2022-04-26 DIAGNOSIS — F41.0 PANIC ATTACK: ICD-10-CM

## 2022-04-26 DIAGNOSIS — F32.A MILD DEPRESSION: ICD-10-CM

## 2022-04-26 DIAGNOSIS — F41.1 GAD (GENERALIZED ANXIETY DISORDER): Primary | ICD-10-CM

## 2022-04-26 PROCEDURE — 99214 OFFICE O/P EST MOD 30 MIN: CPT | Performed by: LEGAL MEDICINE

## 2022-04-26 RX ORDER — FLUOXETINE 10 MG/1
10 CAPSULE ORAL DAILY
Qty: 90 CAPSULE | Refills: 1 | Status: SHIPPED | OUTPATIENT
Start: 2022-04-26 | End: 2022-07-15

## 2022-04-26 RX ORDER — LORAZEPAM 0.5 MG/1
0.5 TABLET ORAL
Qty: 30 TABLET | Refills: 0 | Status: SHIPPED | OUTPATIENT
Start: 2022-04-26 | End: 2022-05-26

## 2022-04-26 NOTE — PROGRESS NOTES
Raulito Young is a 46 y.o. female who was seen by synchronous (real-time) audio-video technology on 4/26/2022 for Letter for School/Work and Anxiety  HPI:Has been having worsening anxiety , palpitation , panic attack and anxiety she had it for few years now it is getting worse ,not sure what are the triggers ( possible long meetings)  She is getting panic attacksymptoms once per week  and anxiety symptoms daily     ,not sure what are the triggers ( possible long meetings)  She is getting panic attacksymptoms once per week  And anxiety symptoms daily     KERON score 8 PHQ9   Is 9     In the past she has used Prozac 20 mg it helped with her symptoms of depression and anxiety but it made her feel numbness in her feelings and lack of reactions    Patient has been called for jury duty, due to anxiety and panic attack episodes she will not be able to complete the jury duty and need to be examined letter will be written to the patient      Assessment & Plan:   Diagnoses and all orders for this visit:    1. KERON (generalized anxiety disorder)    Strongly advised to seek psychotherapy  She agreed to try Prozac with dose of 10 mg instead of 20    She can take Ativan only as needed for panic attacks  -     FLUoxetine (PROzac) 10 mg capsule; Take 1 Capsule by mouth daily for 90 days.  -     LORazepam (ATIVAN) 0.5 mg tablet; Take 1 Tablet by mouth two (2) times daily as needed for Anxiety for up to 30 days. Max Daily Amount: 1 mg.    2. Panic attack  -     FLUoxetine (PROzac) 10 mg capsule; Take 1 Capsule by mouth daily for 90 days.  -     LORazepam (ATIVAN) 0.5 mg tablet; Take 1 Tablet by mouth two (2) times daily as needed for Anxiety for up to 30 days. Max Daily Amount: 1 mg.    3. Mild depression  -     FLUoxetine (PROzac) 10 mg capsule; Take 1 Capsule by mouth daily for 90 days. 712  Subjective:       Prior to Admission medications    Medication Sig Start Date End Date Taking?  Authorizing Provider   FLUoxetine (PROzac) 10 mg capsule Take 1 Capsule by mouth daily for 90 days. 4/26/22 7/25/22 Yes Shelly Merino MD   LORazepam (ATIVAN) 0.5 mg tablet Take 1 Tablet by mouth two (2) times daily as needed for Anxiety for up to 30 days. Max Daily Amount: 1 mg. 4/26/22 5/26/22 Yes Shelly Merino MD   valACYclovir (VALTREX) 1 gram tablet Take 1,000 mg by mouth two (2) times a day. Yes Provider, Historical   ergocalciferol (ERGOCALCIFEROL) 1,250 mcg (50,000 unit) capsule Take 1 Capsule by mouth every seven (7) days for 90 days. 4/1/22 6/30/22 Yes Shelly Merino MD   Slynd 4 mg (28) tab Take 4 mg by mouth daily. 1/31/22  Yes Provider, Historical   ibuprofen 200 mg cap Take  by mouth as needed. Yes Provider, Historical   valACYclovir (VALTREX) 500 mg tablet Take 4 tablets  12 hours apart today, then 4 tablets 12 hours apart at the first sign of fever blisters in the future 2/7/18  Yes Lillie Guillory MD     Patient Active Problem List   Diagnosis Code    Otalgia 388.7    Odynophagia R13.10    Tobacco abuse Z72.0    Alcohol abuse F10.10    Chronic pharyngitis J31.2    Mild depression F32. A    Chronic midline low back pain without sciatica M54.50, G89.29     Patient Active Problem List    Diagnosis Date Noted    Mild depression 04/10/2018    Chronic midline low back pain without sciatica 04/10/2018    Odynophagia 07/20/2014    Tobacco abuse 07/20/2014    Alcohol abuse 07/20/2014    Chronic pharyngitis 07/20/2014    Otalgia      Current Outpatient Medications   Medication Sig Dispense Refill    FLUoxetine (PROzac) 10 mg capsule Take 1 Capsule by mouth daily for 90 days. 90 Capsule 1    LORazepam (ATIVAN) 0.5 mg tablet Take 1 Tablet by mouth two (2) times daily as needed for Anxiety for up to 30 days. Max Daily Amount: 1 mg. 30 Tablet 0    valACYclovir (VALTREX) 1 gram tablet Take 1,000 mg by mouth two (2) times a day.       ergocalciferol (ERGOCALCIFEROL) 1,250 mcg (50,000 unit) capsule Take 1 Capsule by mouth every seven (7) days for 90 days. 12 Capsule 1    Slynd 4 mg (28) tab Take 4 mg by mouth daily.  ibuprofen 200 mg cap Take  by mouth as needed.  valACYclovir (VALTREX) 500 mg tablet Take 4 tablets  12 hours apart today, then 4 tablets 12 hours apart at the first sign of fever blisters in the future 30 Tab 2     No Known Allergies  Past Medical History:   Diagnosis Date    Anxiety     Depression     Mood disorder ([address] 4/10/2018    Near syncope     Otalgia     Tobacco abuse      Past Surgical History:   Procedure Laterality Date    HX LEEP PROCEDURE  02/2009     Family History   Problem Relation Age of Onset    Other Mother         uterine polyps    COPD Mother      Social History     Tobacco Use    Smoking status: Current Every Day Smoker     Packs/day: 1.00     Types: Cigarettes    Smokeless tobacco: Never Used   Substance Use Topics    Alcohol use: No     Alcohol/week: 0.0 standard drinks     Comment: 375ml/day       Review of Systems   Constitutional: Negative for chills, fever, malaise/fatigue and weight loss. HENT: Negative for congestion, ear discharge, ear pain, hearing loss and nosebleeds. Eyes: Negative for blurred vision, double vision and discharge. Respiratory: Negative for cough. Cardiovascular: Negative for chest pain, palpitations, claudication and leg swelling. Gastrointestinal: Negative for abdominal pain, constipation, diarrhea, nausea and vomiting. Genitourinary: Negative for dysuria, frequency and urgency. Musculoskeletal: Negative for myalgias. Skin: Negative for itching and rash. Neurological: Negative for dizziness, tingling, sensory change, speech change, focal weakness, weakness and headaches. Psychiatric/Behavioral: Positive for depression. Negative for hallucinations, memory loss, substance abuse and suicidal ideas. The patient is nervous/anxious and has insomnia. Objective:   No flowsheet data found.    General: alert, cooperative, no distress   Mental  status: normal mood, behavior, speech, dress, motor activity, and thought processes, able to follow commands   HENT: NCAT   Neck: no visualized mass   Resp: no respiratory distress   Neuro: no gross deficits   Skin: no discoloration or lesions of concern on visible areas   Psychiatric: normal affect, consistent with stated mood, no evidence of hallucinations     Additional exam findings: We discussed the expected course, resolution and complications of the diagnosis(es) in detail. Medication risks, benefits, costs, interactions, and alternatives were discussed as indicated. I advised her to contact the office if her condition worsens, changes or fails to improve as anticipated. She expressed understanding with the diagnosis(es) and plan. Beba Tineo, was evaluated through a synchronous (real-time) audio-video encounter. The patient (or guardian if applicable) is aware that this is a billable service, which includes applicable co-pays. Verbal consent to proceed has been obtained. The visit was conducted pursuant to the emergency declaration under the [address] [place] [address] authority and the [employer] and [employer] General Act. Patient identification was verified, and a caregiver was present when appropriate. The patient was located at home in a state where the provider was licensed to provide care.     Lasha Rabago MD

## 2022-04-26 NOTE — TELEPHONE ENCOUNTER
Pt scheduled for VV today to discuss letter for jury duty to be excused; LM on pt's phone w/appointment information; pt will call back if unable to keep.

## 2022-04-26 NOTE — PROGRESS NOTES
Vipin Ingram is a 46 y.o. female (: 1980) presenting to address:    Chief Complaint   Patient presents with    Letter for School/Work    Anxiety       There were no vitals filed for this visit. Hearing/Vision:   No exam data present    Learning Assessment:     Learning Assessment 2014   PRIMARY LEARNER Patient   HIGHEST LEVEL OF EDUCATION - PRIMARY LEARNER  GRADUATED HIGH SCHOOL OR GED   BARRIERS PRIMARY LEARNER NONE   PRIMARY LANGUAGE ENGLISH   LEARNER PREFERENCE PRIMARY DEMONSTRATION   ANSWERED BY patient   RELATIONSHIP SELF     Depression Screening:     3 most recent PHQ Screens 2022   PHQ Not Done -   Little interest or pleasure in doing things Not at all   Feeling down, depressed, irritable, or hopeless Not at all   Total Score PHQ 2 0     Fall Risk Assessment:     Fall Risk Assessment, last 12 mths 2022   Able to walk? Yes   Fall in past 12 months? 0   Do you feel unsteady? 0   Are you worried about falling 0     Abuse Screening:     Abuse Screening Questionnaire 2022   Do you ever feel afraid of your partner? N   Are you in a relationship with someone who physically or mentally threatens you? N   Is it safe for you to go home? Y     ADL Assessment:   No flowsheet data found. Coordination of Care Questionaire:   1. \"Have you been to the ER, urgent care clinic since your last visit? Hospitalized since your last visit? \" No    2. \"Have you seen or consulted any other health care providers outside of the [address] [place] [address] since your last visit? \" No     3. For patients aged 39-70: Has the patient had a colonoscopy / FIT/ Cologuard? NA - based on age    If the patient is female:    4. For patients aged 41-77: Has the patient had a mammogram within the past 2 years? NA - based on age or sex  See top three    5. For patients aged 21-65: Has the patient had a pap smear? Yes - no Care Gap present    Advanced Directive:   1. Do you have an Advanced Directive? NO    2.  Would you like information on Advanced Directives? NO; pt will completed at next office visit.

## 2022-04-26 NOTE — PROGRESS NOTES
[facility]     Chief Complaint   Patient presents with    Letter for School/Work    Anxiety     There were no vitals filed for this visit. HPI:Has been having worsening anxiety , palpitation , panic attack and anxiety she had it for few years now it is getting worse ,not sure what are the triggers ( possible long meetings)  She is getting panic attacksymptoms once per week  And anxiety symptoms daily     Past Medical History:   Diagnosis Date    Anxiety     Depression     Mood disorder ([address] 4/10/2018    Near syncope     Otalgia     Tobacco abuse      Past Surgical History:   Procedure Laterality Date    HX LEEP PROCEDURE  02/2009     Social History     Tobacco Use    Smoking status: Current Every Day Smoker     Packs/day: 1.00     Types: Cigarettes    Smokeless tobacco: Never Used   Substance Use Topics    Alcohol use: No     Alcohol/week: 0.0 standard drinks     Comment: 375ml/day       Family History   Problem Relation Age of Onset    Other Mother         uterine polyps    COPD Mother        Review of Systems   Constitutional: Negative for chills, fever, malaise/fatigue and weight loss. HENT: Negative for congestion, ear discharge, ear pain, hearing loss and nosebleeds. Eyes: Negative for blurred vision, double vision and discharge. Respiratory: Negative for cough, hemoptysis, sputum production, shortness of breath and wheezing. Cardiovascular: Negative for chest pain, palpitations, claudication and leg swelling. Gastrointestinal: Negative for abdominal pain, constipation, diarrhea, nausea and vomiting. Genitourinary: Negative for dysuria, frequency and urgency. Musculoskeletal: Negative for back pain, joint pain, myalgias and neck pain. Skin: Negative for itching and rash. Neurological: Negative for dizziness, tingling, sensory change, speech change, focal weakness, weakness and headaches. Psychiatric/Behavioral: Positive for depression.  Negative for hallucinations, memory loss, substance abuse and suicidal ideas. The patient is nervous/anxious and has insomnia. Physical Exam     Assessment and plan     Plan of care has been discussed with the patient, he agrees to the plan and verbalized understanding. All his questions were answered  More than 50% of the time spent in this visit was counseling the patient about  illness and treatment options         1. KERON (generalized anxiety disorder)  ***  - FLUoxetine (PROzac) 10 mg capsule; Take 1 Capsule by mouth daily for 90 days. Dispense: 90 Capsule; Refill: 1  - LORazepam (ATIVAN) 0.5 mg tablet; Take 1 Tablet by mouth two (2) times daily as needed for Anxiety for up to 30 days. Max Daily Amount: 1 mg. Dispense: 30 Tablet; Refill: 0    2. Panic attack  ***  - FLUoxetine (PROzac) 10 mg capsule; Take 1 Capsule by mouth daily for 90 days. Dispense: 90 Capsule; Refill: 1  - LORazepam (ATIVAN) 0.5 mg tablet; Take 1 Tablet by mouth two (2) times daily as needed for Anxiety for up to 30 days. Max Daily Amount: 1 mg. Dispense: 30 Tablet; Refill: 0    3. Mild depression  ***  - FLUoxetine (PROzac) 10 mg capsule; Take 1 Capsule by mouth daily for 90 days. Dispense: 90 Capsule; Refill: 1    Current Outpatient Medications   Medication Sig Dispense Refill    FLUoxetine (PROzac) 10 mg capsule Take 1 Capsule by mouth daily for 90 days. 90 Capsule 1    LORazepam (ATIVAN) 0.5 mg tablet Take 1 Tablet by mouth two (2) times daily as needed for Anxiety for up to 30 days. Max Daily Amount: 1 mg. 30 Tablet 0    valACYclovir (VALTREX) 1 gram tablet Take 1,000 mg by mouth two (2) times a day.  ergocalciferol (ERGOCALCIFEROL) 1,250 mcg (50,000 unit) capsule Take 1 Capsule by mouth every seven (7) days for 90 days. 12 Capsule 1    Slynd 4 mg (28) tab Take 4 mg by mouth daily.  ibuprofen 200 mg cap Take  by mouth as needed.       valACYclovir (VALTREX) 500 mg tablet Take 4 tablets  12 hours apart today, then 4 tablets 12 hours apart at the first sign of fever blisters in the future 30 Tab 2       Patient Active Problem List    Diagnosis Date Noted    Mild depression 04/10/2018    Chronic midline low back pain without sciatica 04/10/2018    Odynophagia 07/20/2014    Tobacco abuse 07/20/2014    Alcohol abuse 07/20/2014    Chronic pharyngitis 07/20/2014    Otalgia      Results for orders placed or performed in visit on 05/19/17   PAP (IMAGE GUIDED), LIQUID-BASED   Result Value Ref Range    Diagnosis Comment (A)     Specimen adequacy Comment     Clinician provided ICD10 Comment     Performed by: Comment     Electronically signed by: Lolly     . Yenny Marquez Pathologist provided ICD10 Comment     Note: Comment     Test methodology Comment    CHLAMYDIA/GC AMPLIFICATON THINPREP   Result Value Ref Range    Chlamydia trachomatis, MYNOR Negative Negative    Neisseria gonorrhoeae, MYNOR Negative Negative     No visits with results within 3 Month(s) from this visit. Latest known visit with results is:   Office Visit on 05/19/2017   Component Date Value Ref Range Status    Diagnosis 05/19/2017 Comment*  Final    Comment: EPITHELIAL CELL ABNORMALITY. ATYPICAL SQUAMOUS CELLS OF UNDETERMINED SIGNIFICANCE.  Specimen adequacy 05/19/2017 Comment   Final    Satisfactory for evaluation. No endocervical component is identified.  Clinician provided ICD10 05/19/2017 Comment   Final    Z01.419    Performed by: 05/19/2017 Comment   Final    Wilma Hilliard, Cytotechnologist (ASCP)    Electronically signed by: 05/19/2017 Comment   Final    Rina Contreras MD, Pathologist    . 05/19/2017 . Final    Pathologist provided ICD10 05/19/2017 Comment   Final    R87.610    Note: 05/19/2017 Comment   Final    Comment: The Pap smear is a screening test designed to aid in the detection of  premalignant and malignant conditions of the uterine cervix.   It is not a  diagnostic procedure and should not be used as the sole means of detecting  cervical cancer. Both false-positive and false-negative reports do occur.  Test methodology 05/19/2017 Comment   Final    Comment: This liquid based ThinPrep(R) pap test was screened with the  use of an image guided system.       Chlamydia trachomatis, MYNOR 05/19/2017 Negative  Negative Final    Neisseria gonorrhoeae, MYNOR 05/19/2017 Negative  Negative Final

## 2022-04-28 ENCOUNTER — APPOINTMENT (OUTPATIENT)
Dept: FAMILY MEDICINE CLINIC | Age: 42
End: 2022-04-28

## 2022-04-28 DIAGNOSIS — E78.2 MIXED HYPERLIPIDEMIA: ICD-10-CM

## 2022-04-28 LAB
A-G RATIO,AGRAT: 2.2 RATIO (ref 1.1–2.6)
ALBUMIN SERPL-MCNC: 4.6 G/DL (ref 3.5–5)
ALP SERPL-CCNC: 76 U/L (ref 25–115)
ALT SERPL-CCNC: 24 U/L (ref 5–40)
ANION GAP SERPL CALC-SCNC: 14 MMOL/L (ref 3–15)
AST SERPL W P-5'-P-CCNC: 28 U/L (ref 10–37)
BILIRUB SERPL-MCNC: 0.3 MG/DL (ref 0.2–1.2)
BUN SERPL-MCNC: 8 MG/DL (ref 6–22)
CALCIUM SERPL-MCNC: 10.2 MG/DL (ref 8.4–10.5)
CHLORIDE SERPL-SCNC: 105 MMOL/L (ref 98–110)
CHOLEST SERPL-MCNC: 234 MG/DL (ref 110–200)
CO2 SERPL-SCNC: 26 MMOL/L (ref 20–32)
CREAT SERPL-MCNC: 0.7 MG/DL (ref 0.5–1.2)
GFRAA, 66117: >60
GFRNA, 66118: >60
GLOBULIN,GLOB: 2.1 G/DL (ref 2–4)
GLUCOSE SERPL-MCNC: 86 MG/DL (ref 70–99)
HDLC SERPL-MCNC: 3.5 MG/DL (ref 0–5)
HDLC SERPL-MCNC: 66 MG/DL
LDL/HDL RATIO,LDHD: 1.6
LDLC SERPL CALC-MCNC: 108 MG/DL (ref 50–99)
NON-HDL CHOLESTEROL, 011976: 168 MG/DL
POTASSIUM SERPL-SCNC: 3.7 MMOL/L (ref 3.5–5.5)
PROT SERPL-MCNC: 6.7 G/DL (ref 6.4–8.3)
SODIUM SERPL-SCNC: 145 MMOL/L (ref 133–145)
TRIGL SERPL-MCNC: 304 MG/DL (ref 40–149)
VLDLC SERPL CALC-MCNC: 61 MG/DL (ref 8–30)

## 2022-04-30 NOTE — PROGRESS NOTES
All results are normal except lipid profile high cholesterol ,triglyceride   To follow lifestyle modifications decrease carbohydrate ,sugar and unhealthy fats and increase physical activities

## 2022-05-02 NOTE — PROGRESS NOTES
Informed pt of lab results and tx plan; pt would like to given a cholesterol lowering medication; please send to pharmacy.

## 2022-05-05 ENCOUNTER — TELEPHONE (OUTPATIENT)
Dept: FAMILY MEDICINE CLINIC | Age: 42
End: 2022-05-05

## 2022-05-05 DIAGNOSIS — E78.2 MIXED HYPERLIPIDEMIA: Primary | ICD-10-CM

## 2022-05-05 RX ORDER — ATORVASTATIN CALCIUM 10 MG/1
10 TABLET, FILM COATED ORAL DAILY
Qty: 90 TABLET | Refills: 3 | Status: SHIPPED | OUTPATIENT
Start: 2022-05-05 | End: 2022-09-01

## 2022-05-05 NOTE — TELEPHONE ENCOUNTER
Pt called the office stating that she had lab work done on 4/1/22 and was told that she would be placed on cholesterol medication. Pt was inquiring if that had been taking care of by Dr Jon Mims. I do see an encounter from today in regards to lab results.  Please advise

## 2022-05-05 NOTE — TELEPHONE ENCOUNTER
Treatment prescription was sent 10 mg of atorvastatin    Patient still have to eat healthy exercise as tolerated and consider smoking cessation    Follow-up in 6 months to recheck lipid profile

## 2022-06-09 ENCOUNTER — CLINICAL SUPPORT (OUTPATIENT)
Dept: FAMILY MEDICINE CLINIC | Age: 42
End: 2022-06-09

## 2022-06-09 VITALS — SYSTOLIC BLOOD PRESSURE: 134 MMHG | DIASTOLIC BLOOD PRESSURE: 70 MMHG

## 2022-06-09 DIAGNOSIS — Z01.30 BLOOD PRESSURE CHECK: Primary | ICD-10-CM

## 2022-06-09 NOTE — PROGRESS NOTES
Pt presents to check of blood pressure; pt was advised at dental visit that BP was elevated; pt deals w/anxiety and depression and has been more anxious since given her elevated blood pressure; BP was checked today: 134/70; PCP was advised of reading and pt is to monitor BP readings at home, AM and PM w/instructions; pt will be seen on:   Future Appointments   Date Time Provider Josie Gomez   6/9/2022 10:15 AM MD BALDO Monzon   6/13/2022  2:00 PM MD BALDO Monzon     Treatment pans will be discussed at that visit.

## 2022-06-13 ENCOUNTER — OFFICE VISIT (OUTPATIENT)
Dept: FAMILY MEDICINE CLINIC | Age: 42
End: 2022-06-13
Payer: COMMERCIAL

## 2022-06-13 VITALS
RESPIRATION RATE: 14 BRPM | HEIGHT: 62 IN | TEMPERATURE: 97.8 F | OXYGEN SATURATION: 99 % | BODY MASS INDEX: 27.79 KG/M2 | DIASTOLIC BLOOD PRESSURE: 90 MMHG | HEART RATE: 106 BPM | WEIGHT: 151 LBS | SYSTOLIC BLOOD PRESSURE: 128 MMHG

## 2022-06-13 DIAGNOSIS — F17.210 CIGARETTE SMOKER: ICD-10-CM

## 2022-06-13 DIAGNOSIS — F41.1 GAD (GENERALIZED ANXIETY DISORDER): Primary | ICD-10-CM

## 2022-06-13 DIAGNOSIS — F10.10 ETOH ABUSE: ICD-10-CM

## 2022-06-13 DIAGNOSIS — I10 HYPERTENSION, UNSPECIFIED TYPE: ICD-10-CM

## 2022-06-13 PROCEDURE — 99214 OFFICE O/P EST MOD 30 MIN: CPT | Performed by: LEGAL MEDICINE

## 2022-06-13 RX ORDER — METOPROLOL SUCCINATE 25 MG/1
25 TABLET, EXTENDED RELEASE ORAL DAILY
Qty: 30 TABLET | Refills: 1 | Status: SHIPPED | OUTPATIENT
Start: 2022-06-13 | End: 2022-07-15 | Stop reason: SDUPTHER

## 2022-06-13 NOTE — PROGRESS NOTES
[address] [facility]     Chief Complaint   Patient presents with    Hypertension     Vitals:    06/13/22 1359 06/13/22 1443   BP: (!) 130/90 (!) 128/90   Pulse: (!) 124 (!) 106   Resp: 14    Temp: 97.8 °F (36.6 °C)    TempSrc: Temporal    SpO2: 99%    Weight: 151 lb (68.5 kg)    Height: 5' 1.5\" (1.562 m)    PainSc:   0 - No pain          HPI:Noman is here to address blood pressure has been elevated   She got few readings at the pharmacy  143/88  140/90   138/90   DPB  Highest 94   SBP highest 149  She drinks daily  liquor drinks with 12 shots per night she mix them in mixed drinks, the patient does understand that she have a alcoholism, and she does understand she needs to stop drinking alcohol, both her parents are alcoholics    She has anxiety and she has not taken Prozac because she cannot drink alcohol while taking this medication      Past Medical History:   Diagnosis Date    Anxiety     Depression     Mood disorder ([address] 4/10/2018    Near syncope     Otalgia     Tobacco abuse      Past Surgical History:   Procedure Laterality Date    HX LEEP PROCEDURE  02/2009     Social History     Tobacco Use    Smoking status: Current Every Day Smoker     Packs/day: 1.00     Types: Cigarettes    Smokeless tobacco: Never Used   Substance Use Topics    Alcohol use: No     Alcohol/week: 0.0 standard drinks     Comment: 375ml/day       Family History   Problem Relation Age of Onset    Other Mother         uterine polyps    COPD Mother        Review of Systems   Constitutional: Negative for chills, fever, malaise/fatigue and weight loss. HENT: Negative for congestion, ear discharge, ear pain, hearing loss, nosebleeds and sinus pain. Eyes: Negative for blurred vision, double vision and discharge. Respiratory: Negative for cough. Cardiovascular: Positive for palpitations. Negative for chest pain, claudication and leg swelling.    Gastrointestinal: Negative for abdominal pain, constipation, diarrhea, nausea and vomiting. Genitourinary: Negative for dysuria, frequency and urgency. Musculoskeletal: Negative for myalgias. Skin: Negative for itching and rash. Neurological: Negative for dizziness, tingling, sensory change, speech change, focal weakness, weakness and headaches. Psychiatric/Behavioral: Negative for depression, hallucinations, substance abuse and suicidal ideas. The patient is nervous/anxious. Physical Exam  Vitals and nursing note reviewed. Constitutional:       General: She is not in acute distress. Appearance: She is well-developed. She is not diaphoretic. HENT:      Head: Normocephalic and atraumatic. Eyes:      General: No scleral icterus. Right eye: No discharge. Left eye: No discharge. Conjunctiva/sclera: Conjunctivae normal.      Pupils: Pupils are equal, round, and reactive to light. Neck:      Thyroid: No thyromegaly. Cardiovascular:      Rate and Rhythm: Normal rate and regular rhythm. Heart sounds: Normal heart sounds. Pulmonary:      Effort: Pulmonary effort is normal. No respiratory distress. Breath sounds: Normal breath sounds. No wheezing or rales. Chest:      Chest wall: No tenderness. Abdominal:      General: There is no distension. Palpations: Abdomen is soft. Tenderness: There is no abdominal tenderness. There is no rebound. Musculoskeletal:         General: No tenderness or deformity. Normal range of motion. Right lower leg: No edema. Left lower leg: No edema. Lymphadenopathy:      Cervical: No cervical adenopathy. Skin:     General: Skin is warm and dry. Coloration: Skin is not pale. Findings: No erythema or rash. Neurological:      Mental Status: She is alert and oriented to person, place, and time. Cranial Nerves: No cranial nerve deficit. Motor: No weakness.       Coordination: Coordination normal.      Gait: Gait normal.   Psychiatric:         Behavior: Behavior normal. Thought Content: Thought content normal.         Judgment: Judgment normal.          Assessment and plan     Plan of care has been discussed with the patient, he agrees to the plan and verbalized understanding. All his questions were answered  More than 50% of the time spent in this visit was counseling the patient about  illness and treatment options         1. KERON (generalized anxiety disorder)  Due to alcohol drinking she is not taking Prozac! She will start cutting back on her alcohol intake and that she will take Prozac    2. Cigarette smoker  No plan to quit smoking at this time    3. ETOH abuse  Patient will consider seriously decreasing alcohol intake and eventually stopping I strongly advised her to join AA and also seek treatment for alcohol withdrawal    4. Hypertension, unspecified type  To start on metoprolol for hypertension as well as her palpitations  - metoprolol succinate (TOPROL-XL) 25 mg XL tablet; Take 1 Tablet by mouth daily for 30 days. Dispense: 30 Tablet; Refill: 1    Current Outpatient Medications   Medication Sig Dispense Refill    metoprolol succinate (TOPROL-XL) 25 mg XL tablet Take 1 Tablet by mouth daily for 30 days. 30 Tablet 1    atorvastatin (LIPITOR) 10 mg tablet Take 1 Tablet by mouth daily for 90 days. 90 Tablet 3    valACYclovir (VALTREX) 1 gram tablet Take 1,000 mg by mouth two (2) times a day.  ergocalciferol (ERGOCALCIFEROL) 1,250 mcg (50,000 unit) capsule Take 1 Capsule by mouth every seven (7) days for 90 days. 12 Capsule 1    Slynd 4 mg (28) tab Take 4 mg by mouth daily.  ibuprofen 200 mg cap Take  by mouth as needed.  valACYclovir (VALTREX) 500 mg tablet Take 4 tablets  12 hours apart today, then 4 tablets 12 hours apart at the first sign of fever blisters in the future 30 Tab 2    FLUoxetine (PROzac) 10 mg capsule Take 1 Capsule by mouth daily for 90 days.  (Patient not taking: Reported on 6/13/2022) 90 Capsule 1       Patient Active Problem List Diagnosis Date Noted    Mild depression 04/10/2018    Chronic midline low back pain without sciatica 04/10/2018    Odynophagia 07/20/2014    Tobacco abuse 07/20/2014    Alcohol abuse 07/20/2014    Chronic pharyngitis 07/20/2014    Otalgia      Results for orders placed or performed in visit on 83/93/28   METABOLIC PANEL, COMPREHENSIVE   Result Value Ref Range    Glucose 86 70 - 99 mg/dL    BUN 8 6 - 22 mg/dL    Creatinine 0.7 0.5 - 1.2 mg/dL    Sodium 145 133 - 145 mmol/L    Potassium 3.7 3.5 - 5.5 mmol/L    Chloride 105 98 - 110 mmol/L    CO2 26 20 - 32 mmol/L    AST (SGOT) 28 10 - 37 U/L    ALT (SGPT) 24 5 - 40 U/L    Alk. phosphatase 76 25 - 115 U/L    Bilirubin, total 0.3 0.2 - 1.2 mg/dL    Calcium 10.2 8.4 - 10.5 mg/dL    Protein, total 6.7 6.4 - 8.3 g/dL    Albumin 4.6 3.5 - 5.0 g/dL    A-G Ratio 2.2 1.1 - 2.6 ratio    Globulin 2.1 2.0 - 4.0 g/dL    Anion gap 14.0 3.0 - 15.0 mmol/L    GFRAA >60.0 >60.0    GFRNA >60.0 >60.0   LIPID PANEL   Result Value Ref Range    Triglyceride 304 (H) 40 - 149 mg/dL    HDL Cholesterol 66 >=40 mg/dL    Cholesterol, total 234 (H) 110 - 200 mg/dL    CHOLESTEROL/HDL 3.5 0.0 - 5.0    Non-HDL Cholesterol 168 (H) <130 mg/dL    LDL, calculated 108 (H) 50 - 99 mg/dL    VLDL, calculated 61 (H) 8 - 30 mg/dL    LDL/HDL Ratio 1.6      Appointment on 04/28/2022   Component Date Value Ref Range Status    Glucose 04/28/2022 86  70 - 99 mg/dL Final    BUN 04/28/2022 8  6 - 22 mg/dL Final    Creatinine 04/28/2022 0.7  0.5 - 1.2 mg/dL Final    Sodium 04/28/2022 145  133 - 145 mmol/L Final    Potassium 04/28/2022 3.7  3.5 - 5.5 mmol/L Final    Chloride 04/28/2022 105  98 - 110 mmol/L Final    CO2 04/28/2022 26  20 - 32 mmol/L Final    AST (SGOT) 04/28/2022 28  10 - 37 U/L Final    ALT (SGPT) 04/28/2022 24  5 - 40 U/L Final    Alk.  phosphatase 04/28/2022 76  25 - 115 U/L Final    Bilirubin, total 04/28/2022 0.3  0.2 - 1.2 mg/dL Final    Calcium 04/28/2022 10.2  8.4 - 10.5 mg/dL Final    Protein, total 04/28/2022 6.7  6.4 - 8.3 g/dL Final    Albumin 04/28/2022 4.6  3.5 - 5.0 g/dL Final    A-G Ratio 04/28/2022 2.2  1.1 - 2.6 ratio Final    Globulin 04/28/2022 2.1  2.0 - 4.0 g/dL Final    Anion gap 04/28/2022 14.0  3.0 - 15.0 mmol/L Final    Comment: Anion Gap calculation based on electrolyte reference ranges. Test includes Albumin, Alkaline Phosphatase, ALT, AST, BUN, Calcium, CO2,  Chloride, Creatinine, Glucose, Potassium, Sodium, Total Bilirubin and Total  Protein. Estimated GFR results are reported in mL/min/1.73 sq.m. by the MDRD equation. This eGFR is validated for stable chronic renal failure patients. This   equation  is unreliable in acute illness or patients with normal renal function.  GFRAA 04/28/2022 >60.0  >60.0 Final    GFRNA 04/28/2022 >60.0  >60.0 Final    Triglyceride 04/28/2022 304* 40 - 149 mg/dL Final    HDL Cholesterol 04/28/2022 66  >=40 mg/dL Final    Cholesterol, total 04/28/2022 234* 110 - 200 mg/dL Final    CHOLESTEROL/HDL 04/28/2022 3.5  0.0 - 5.0 Final    Non-HDL Cholesterol 04/28/2022 168* <130 mg/dL Final    LDL, calculated 04/28/2022 108* 50 - 99 mg/dL Final    VLDL, calculated 04/28/2022 61* 8 - 30 mg/dL Final    LDL/HDL Ratio 04/28/2022 1.6   Final    Comment: Test includes cholesterol, HDL cholesterol, triglycerides and LDL. Cholesterol Recommended NCEP guidelines in mg/dL:    Less than 200            Desirable  200 - 239                Borderline High  Greater than or  = 240   High      Please Note:  Total Chol/HDL Ratio                     Men     Women  1/2 Avg. Risk    3.4     3.3      Avg. Risk    5.0     4.4  2X  Avg. Risk    9.6     7.1  3X  Avg. Risk   23.4    11.0                  Follow-up and Dispositions    · Return in about 4 months (around 10/13/2022).

## 2022-06-13 NOTE — PROGRESS NOTES
Hever Van is a 46 y.o. female (: 1980) presenting to address:    Chief Complaint   Patient presents with    Hypertension       Vitals:    22 1359 22 1443   BP: (!) 130/90 (!) 128/90   Pulse: (!) 124 (!) 106   Resp: 14    Temp: 97.8 °F (36.6 °C)    TempSrc: Temporal    SpO2: 99%    Weight: 151 lb (68.5 kg)    Height: 5' 1.5\" (1.562 m)    PainSc:   0 - No pain        Hearing/Vision:   No exam data present    Learning Assessment:     Learning Assessment 2014   PRIMARY LEARNER Patient   HIGHEST LEVEL OF EDUCATION - PRIMARY LEARNER  GRADUATED HIGH SCHOOL OR GED   BARRIERS PRIMARY LEARNER NONE   PRIMARY LANGUAGE ENGLISH   LEARNER PREFERENCE PRIMARY DEMONSTRATION   ANSWERED BY patient   RELATIONSHIP SELF     Depression Screening:     3 most recent PHQ Screens 2022   PHQ Not Done -   Little interest or pleasure in doing things Not at all   Feeling down, depressed, irritable, or hopeless Not at all   Total Score PHQ 2 0   Trouble falling or staying asleep, or sleeping too much -   Feeling tired or having little energy -   Poor appetite, weight loss, or overeating -   Feeling bad about yourself - or that you are a failure or have let yourself or your family down -   Trouble concentrating on things such as school, work, reading, or watching TV -   Moving or speaking so slowly that other people could have noticed; or the opposite being so fidgety that others notice -   Thoughts of being better off dead, or hurting yourself in some way -   PHQ 9 Score -   How difficult have these problems made it for you to do your work, take care of your home and get along with others -     Fall Risk Assessment:     Fall Risk Assessment, last 12 mths 2022   Able to walk? Yes   Fall in past 12 months? 0   Do you feel unsteady? 0   Are you worried about falling 0     Abuse Screening:     Abuse Screening Questionnaire 2022   Do you ever feel afraid of your partner?  N   Are you in a relationship with someone who physically or mentally threatens you? N   Is it safe for you to go home? Y     ADL Assessment:   No flowsheet data found. Coordination of Care Questionaire:   1. \"Have you been to the ER, urgent care clinic since your last visit? Hospitalized since your last visit? \" No    2. \"Have you seen or consulted any other health care providers outside of the [address] [place] [address] since your last visit? \" No     3. For patients aged 39-70: Has the patient had a colonoscopy? NA - based on age     If the patient is female:    4. For patients aged 41-77: Has the patient had a mammogram within the past 2 years? NA - based on age    11. For patients aged 21-65: Has the patient had a pap smear? Yes - Care Gap present. Most recent result on file    Advanced Directive:   1. Do you have an Advanced Directive? NO    2. Would you like information on Advanced Directives?  NO

## 2022-07-15 ENCOUNTER — OFFICE VISIT (OUTPATIENT)
Dept: FAMILY MEDICINE CLINIC | Age: 42
End: 2022-07-15
Payer: COMMERCIAL

## 2022-07-15 VITALS
SYSTOLIC BLOOD PRESSURE: 135 MMHG | DIASTOLIC BLOOD PRESSURE: 84 MMHG | HEIGHT: 62 IN | HEART RATE: 94 BPM | TEMPERATURE: 98.2 F | OXYGEN SATURATION: 99 % | RESPIRATION RATE: 15 BRPM | BODY MASS INDEX: 28.34 KG/M2 | WEIGHT: 154 LBS

## 2022-07-15 DIAGNOSIS — E55.9 VITAMIN D DEFICIENCY: ICD-10-CM

## 2022-07-15 DIAGNOSIS — F17.210 CIGARETTE SMOKER: ICD-10-CM

## 2022-07-15 DIAGNOSIS — F41.1 GAD (GENERALIZED ANXIETY DISORDER): ICD-10-CM

## 2022-07-15 DIAGNOSIS — F41.0 PANIC ATTACK: ICD-10-CM

## 2022-07-15 DIAGNOSIS — F10.10 ETOH ABUSE: ICD-10-CM

## 2022-07-15 DIAGNOSIS — B00.9 HSV-1 (HERPES SIMPLEX VIRUS 1) INFECTION: ICD-10-CM

## 2022-07-15 DIAGNOSIS — I10 ESSENTIAL HYPERTENSION: Primary | ICD-10-CM

## 2022-07-15 DIAGNOSIS — E78.2 MIXED HYPERLIPIDEMIA: ICD-10-CM

## 2022-07-15 DIAGNOSIS — F32.A MILD DEPRESSION: ICD-10-CM

## 2022-07-15 PROCEDURE — 99214 OFFICE O/P EST MOD 30 MIN: CPT | Performed by: LEGAL MEDICINE

## 2022-07-15 RX ORDER — ERGOCALCIFEROL 1.25 MG/1
50000 CAPSULE ORAL
COMMUNITY
Start: 2022-07-07 | End: 2022-09-29

## 2022-07-15 RX ORDER — VALACYCLOVIR HYDROCHLORIDE 1 G/1
1000 TABLET, FILM COATED ORAL 2 TIMES DAILY
Qty: 10 TABLET | Refills: 5 | Status: SHIPPED | OUTPATIENT
Start: 2022-07-15 | End: 2022-09-01

## 2022-07-15 RX ORDER — LORAZEPAM 0.5 MG/1
0.5 TABLET ORAL
Qty: 30 TABLET | Refills: 0 | Status: SHIPPED | OUTPATIENT
Start: 2022-07-15 | End: 2022-08-14

## 2022-07-15 RX ORDER — METOPROLOL SUCCINATE 25 MG/1
25 TABLET, EXTENDED RELEASE ORAL DAILY
Qty: 90 TABLET | Refills: 1 | Status: SHIPPED | OUTPATIENT
Start: 2022-07-15 | End: 2022-10-13

## 2022-07-15 RX ORDER — METOPROLOL SUCCINATE 25 MG/1
25 TABLET, EXTENDED RELEASE ORAL DAILY
COMMUNITY
End: 2022-07-15 | Stop reason: SDUPTHER

## 2022-07-15 NOTE — PROGRESS NOTES
[address] [facility]     Chief Complaint   Patient presents with    Medication Evaluation     Vitals:    07/15/22 0946 07/15/22 1042   BP: 124/74 135/84   Pulse: 94    Resp: 15    Temp: 98.2 °F (36.8 °C)    TempSrc: Temporal    SpO2: 99%    Weight: 154 lb (69.9 kg)    Height: 5' 1.5\" (1.562 m)    PainSc:   3    PainLoc: Elbow          HPI: Mrs. Paul Kern is here for follow-up and to get medication refill  She has been working on decreasing alcohol drinking  Stopped hard liquor  And drinilikg alcohol drinks and drecresing amount of of the alcohol drinks gradually  Smoking 3/4  pack per day she is not ready at this time to quit smoking        Past Medical History:   Diagnosis Date    Anxiety     Depression     Mood disorder ([address] 4/10/2018    Near syncope     Otalgia     Tobacco abuse      Past Surgical History:   Procedure Laterality Date    HX LEEP PROCEDURE  02/2009     Social History     Tobacco Use    Smoking status: Current Every Day Smoker     Packs/day: 1.00     Types: Cigarettes    Smokeless tobacco: Never Used   Substance Use Topics    Alcohol use: No     Alcohol/week: 0.0 standard drinks     Comment: 375ml/day       Family History   Problem Relation Age of Onset    Other Mother         uterine polyps    COPD Mother        Review of Systems   Constitutional: Negative for chills, fever, malaise/fatigue and weight loss. HENT: Negative for congestion, ear discharge, ear pain, hearing loss, nosebleeds, sinus pain and sore throat. Eyes: Negative for blurred vision, double vision and discharge. Respiratory: Negative for cough, hemoptysis, sputum production, shortness of breath and wheezing. Cardiovascular: Negative for chest pain, palpitations, claudication and leg swelling. Gastrointestinal: Negative for abdominal pain, constipation, diarrhea, nausea and vomiting. Genitourinary: Negative for dysuria, flank pain, frequency, hematuria and urgency. Musculoskeletal: Positive for myalgias. Negative for back pain, falls, joint pain and neck pain. She still having tennis  elbow pain on the right side and now the left side also started hurting   Skin: Negative for itching and rash. Neurological: Negative for dizziness, tingling, sensory change, speech change, focal weakness, weakness and headaches. Psychiatric/Behavioral: Negative for depression, hallucinations, substance abuse and suicidal ideas. The patient is nervous/anxious. Physical Exam  Vitals and nursing note reviewed. Constitutional:       General: She is not in acute distress. Appearance: She is well-developed. She is not diaphoretic. HENT:      Head: Normocephalic and atraumatic. Eyes:      General: No scleral icterus. Right eye: No discharge. Left eye: No discharge. Neck:      Thyroid: No thyromegaly. Cardiovascular:      Rate and Rhythm: Normal rate and regular rhythm. Heart sounds: Normal heart sounds. Pulmonary:      Effort: Pulmonary effort is normal. No respiratory distress. Breath sounds: Normal breath sounds. No wheezing or rales. Chest:      Chest wall: No tenderness. Abdominal:      General: There is no distension. Palpations: Abdomen is soft. Tenderness: There is no abdominal tenderness. There is no rebound. Musculoskeletal:         General: No deformity. Normal range of motion. Comments: Tenderness on the right elbow on the lateral epicondyles    Lymphadenopathy:      Cervical: No cervical adenopathy. Skin:     General: Skin is warm and dry. Coloration: Skin is not jaundiced or pale. Findings: No bruising, erythema, lesion or rash. Neurological:      General: No focal deficit present. Mental Status: She is alert and oriented to person, place, and time. Cranial Nerves: No cranial nerve deficit. Motor: No weakness.       Coordination: Coordination normal.      Gait: Gait normal.   Psychiatric:         Mood and Affect: Mood normal.         Behavior: Behavior normal.         Thought Content: Thought content normal.         Judgment: Judgment normal.          Assessment and plan     Plan of care has been discussed with the patient, he agrees to the plan and verbalized understanding. All his questions were answered  More than 50% of the time spent in this visit was counseling the patient about  illness and treatment options         1. Essential hypertension  Blood pressures well controlled on metoprolol  - metoprolol succinate (TOPROL-XL) 25 mg XL tablet; Take 1 Tablet by mouth daily for 90 days. Dispense: 90 Tablet; Refill: 1    2. Mild depression  Is stable and improving    3. ETOH abuse  She is working on reducing alcohol and alcohol drinks    4. Cigarette smoker  She smokes three-quarter pack daily planning at this time to quit smoking    5. Mixed hyperlipidemia  She is not on statins should be working on lifestyle modifications    6. Vitamin D deficiency  She is taking vitamin D 50,000 unit weekly    7. KERON (generalized anxiety disorder)  She takes Ativan only for severe anxiety and panic attack  - LORazepam (ATIVAN) 0.5 mg tablet; Take 1 Tablet by mouth two (2) times daily as needed for Anxiety for up to 30 days. Max Daily Amount: 1 mg. Dispense: 30 Tablet; Refill: 0    8. Panic attack  Only use it as needed  - LORazepam (ATIVAN) 0.5 mg tablet; Take 1 Tablet by mouth two (2) times daily as needed for Anxiety for up to 30 days. Max Daily Amount: 1 mg. Dispense: 30 Tablet; Refill: 0    9. HSV-1 (herpes simplex virus 1) infection  She using Valtrex for breakthrough for herpes on her lips   - valACYclovir (VALTREX) 1 gram tablet; Take 1 Tablet by mouth two (2) times a day for 5 days. Dispense: 10 Tablet; Refill: 5    Current Outpatient Medications   Medication Sig Dispense Refill    valACYclovir (VALTREX) 1 gram tablet Take 1 Tablet by mouth two (2) times a day for 5 days.  10 Tablet 5    LORazepam (ATIVAN) 0.5 mg tablet Take 1 Tablet by mouth two (2) times daily as needed for Anxiety for up to 30 days. Max Daily Amount: 1 mg. 30 Tablet 0    metoprolol succinate (TOPROL-XL) 25 mg XL tablet Take 1 Tablet by mouth daily for 90 days. 90 Tablet 1    atorvastatin (LIPITOR) 10 mg tablet Take 1 Tablet by mouth daily for 90 days. 90 Tablet 3    Slynd 4 mg (28) tab Take 4 mg by mouth daily.  ibuprofen 200 mg cap Take  by mouth as needed.  valACYclovir (VALTREX) 500 mg tablet Take 4 tablets  12 hours apart today, then 4 tablets 12 hours apart at the first sign of fever blisters in the future 30 Tab 2    Vitamin D2 1,250 mcg (50,000 unit) capsule Take 50,000 Units by mouth every seven (7) days. Patient Active Problem List    Diagnosis Date Noted    Mild depression 04/10/2018    Chronic midline low back pain without sciatica 04/10/2018    Odynophagia 07/20/2014    Tobacco abuse 07/20/2014    Alcohol abuse 07/20/2014    Chronic pharyngitis 07/20/2014    Otalgia      Results for orders placed or performed in visit on 27/86/67   METABOLIC PANEL, COMPREHENSIVE   Result Value Ref Range    Glucose 86 70 - 99 mg/dL    BUN 8 6 - 22 mg/dL    Creatinine 0.7 0.5 - 1.2 mg/dL    Sodium 145 133 - 145 mmol/L    Potassium 3.7 3.5 - 5.5 mmol/L    Chloride 105 98 - 110 mmol/L    CO2 26 20 - 32 mmol/L    AST (SGOT) 28 10 - 37 U/L    ALT (SGPT) 24 5 - 40 U/L    Alk.  phosphatase 76 25 - 115 U/L    Bilirubin, total 0.3 0.2 - 1.2 mg/dL    Calcium 10.2 8.4 - 10.5 mg/dL    Protein, total 6.7 6.4 - 8.3 g/dL    Albumin 4.6 3.5 - 5.0 g/dL    A-G Ratio 2.2 1.1 - 2.6 ratio    Globulin 2.1 2.0 - 4.0 g/dL    Anion gap 14.0 3.0 - 15.0 mmol/L    GFRAA >60.0 >60.0    GFRNA >60.0 >60.0   LIPID PANEL   Result Value Ref Range    Triglyceride 304 (H) 40 - 149 mg/dL    HDL Cholesterol 66 >=40 mg/dL    Cholesterol, total 234 (H) 110 - 200 mg/dL    CHOLESTEROL/HDL 3.5 0.0 - 5.0    Non-HDL Cholesterol 168 (H) <130 mg/dL    LDL, calculated 108 (H) 50 - 99 mg/dL VLDL, calculated 61 (H) 8 - 30 mg/dL    LDL/HDL Ratio 1.6      Appointment on 04/28/2022   Component Date Value Ref Range Status    Glucose 04/28/2022 86  70 - 99 mg/dL Final    BUN 04/28/2022 8  6 - 22 mg/dL Final    Creatinine 04/28/2022 0.7  0.5 - 1.2 mg/dL Final    Sodium 04/28/2022 145  133 - 145 mmol/L Final    Potassium 04/28/2022 3.7  3.5 - 5.5 mmol/L Final    Chloride 04/28/2022 105  98 - 110 mmol/L Final    CO2 04/28/2022 26  20 - 32 mmol/L Final    AST (SGOT) 04/28/2022 28  10 - 37 U/L Final    ALT (SGPT) 04/28/2022 24  5 - 40 U/L Final    Alk. phosphatase 04/28/2022 76  25 - 115 U/L Final    Bilirubin, total 04/28/2022 0.3  0.2 - 1.2 mg/dL Final    Calcium 04/28/2022 10.2  8.4 - 10.5 mg/dL Final    Protein, total 04/28/2022 6.7  6.4 - 8.3 g/dL Final    Albumin 04/28/2022 4.6  3.5 - 5.0 g/dL Final    A-G Ratio 04/28/2022 2.2  1.1 - 2.6 ratio Final    Globulin 04/28/2022 2.1  2.0 - 4.0 g/dL Final    Anion gap 04/28/2022 14.0  3.0 - 15.0 mmol/L Final    Comment: Anion Gap calculation based on electrolyte reference ranges. Test includes Albumin, Alkaline Phosphatase, ALT, AST, BUN, Calcium, CO2,  Chloride, Creatinine, Glucose, Potassium, Sodium, Total Bilirubin and Total  Protein. Estimated GFR results are reported in mL/min/1.73 sq.m. by the MDRD equation. This eGFR is validated for stable chronic renal failure patients. This   equation  is unreliable in acute illness or patients with normal renal function.               GFRAA 04/28/2022 >60.0  >60.0 Final    GFRNA 04/28/2022 >60.0  >60.0 Final    Triglyceride 04/28/2022 304* 40 - 149 mg/dL Final    HDL Cholesterol 04/28/2022 66  >=40 mg/dL Final    Cholesterol, total 04/28/2022 234* 110 - 200 mg/dL Final    CHOLESTEROL/HDL 04/28/2022 3.5  0.0 - 5.0 Final    Non-HDL Cholesterol 04/28/2022 168* <130 mg/dL Final    LDL, calculated 04/28/2022 108* 50 - 99 mg/dL Final    VLDL, calculated 04/28/2022 61* 8 - 30 mg/dL Final    LDL/HDL Ratio 04/28/2022 1.6   Final    Comment: Test includes cholesterol, HDL cholesterol, triglycerides and LDL. Cholesterol Recommended NCEP guidelines in mg/dL:    Less than 200            Desirable  200 - 239                Borderline High  Greater than or  = 240   High      Please Note:  Total Chol/HDL Ratio                     Men     Women  1/2 Avg. Risk    3.4     3.3      Avg. Risk    5.0     4.4  2X  Avg. Risk    9.6     7.1  3X  Avg.  Risk   23.4    11.0                  Follow-up and Dispositions    · Return in about 2 months (around 9/15/2022) for for annual physical.

## 2022-07-15 NOTE — PROGRESS NOTES
Fili Mack is a 46 y.o. female (: 1980) presenting to address:    Chief Complaint   Patient presents with    Medication Evaluation       Vitals:    07/15/22 0946   BP: 124/74   Pulse: 94   Resp: 15   Temp: 98.2 °F (36.8 °C)   TempSrc: Temporal   SpO2: 99%   Weight: 154 lb (69.9 kg)   Height: 5' 1.5\" (1.562 m)   PainSc:   3   PainLoc: Elbow       Hearing/Vision:   No exam data present    Learning Assessment:     Learning Assessment 2014   PRIMARY LEARNER Patient   HIGHEST LEVEL OF EDUCATION - PRIMARY LEARNER  GRADUATED HIGH SCHOOL OR GED   BARRIERS PRIMARY LEARNER NONE   PRIMARY LANGUAGE ENGLISH   LEARNER PREFERENCE PRIMARY DEMONSTRATION   ANSWERED BY patient   RELATIONSHIP SELF     Depression Screening:     3 most recent PHQ Screens 7/15/2022   PHQ Not Done -   Little interest or pleasure in doing things Not at all   Feeling down, depressed, irritable, or hopeless Not at all   Total Score PHQ 2 0   Trouble falling or staying asleep, or sleeping too much -   Feeling tired or having little energy -   Poor appetite, weight loss, or overeating -   Feeling bad about yourself - or that you are a failure or have let yourself or your family down -   Trouble concentrating on things such as school, work, reading, or watching TV -   Moving or speaking so slowly that other people could have noticed; or the opposite being so fidgety that others notice -   Thoughts of being better off dead, or hurting yourself in some way -   PHQ 9 Score -   How difficult have these problems made it for you to do your work, take care of your home and get along with others -     Fall Risk Assessment:     Fall Risk Assessment, last 12 mths 2022   Able to walk? Yes   Fall in past 12 months? 0   Do you feel unsteady? 0   Are you worried about falling 0     Abuse Screening:     Abuse Screening Questionnaire 2022   Do you ever feel afraid of your partner?  N   Are you in a relationship with someone who physically or mentally threatens you? N   Is it safe for you to go home? Y     ADL Assessment:   No flowsheet data found. Coordination of Care Questionaire:   1. \"Have you been to the ER, urgent care clinic since your last visit? Hospitalized since your last visit? \" No    2. \"Have you seen or consulted any other health care providers outside of the [address] [place] [address] since your last visit? \" No     3. For patients aged 39-70: Has the patient had a colonoscopy / FIT/ Cologuard? NA - based on age    If the patient is female:    4. For patients aged 41-77: Has the patient had a mammogram within the past 2 years? Yes - no Care Gap present  See top three    5. For patients aged 21-65: Has the patient had a pap smear? Yes - no Care Gap present    Advanced Directive:   1. Do you have an Advanced Directive? NO    2. Would you like information on Advanced Directives?  NO

## 2022-09-01 ENCOUNTER — OFFICE VISIT (OUTPATIENT)
Dept: FAMILY MEDICINE CLINIC | Age: 42
End: 2022-09-01
Payer: COMMERCIAL

## 2022-09-01 VITALS
OXYGEN SATURATION: 98 % | TEMPERATURE: 97.9 F | HEIGHT: 62 IN | WEIGHT: 153 LBS | BODY MASS INDEX: 28.16 KG/M2 | RESPIRATION RATE: 16 BRPM | DIASTOLIC BLOOD PRESSURE: 80 MMHG | HEART RATE: 89 BPM | SYSTOLIC BLOOD PRESSURE: 118 MMHG

## 2022-09-01 DIAGNOSIS — S00.83XA FOREHEAD CONTUSION, INITIAL ENCOUNTER: ICD-10-CM

## 2022-09-01 DIAGNOSIS — H66.91 ACUTE OTITIS MEDIA, RIGHT: Primary | ICD-10-CM

## 2022-09-01 PROCEDURE — 99213 OFFICE O/P EST LOW 20 MIN: CPT | Performed by: FAMILY MEDICINE

## 2022-09-01 RX ORDER — CHOLECALCIFEROL (VITAMIN D3) 50 MCG
CAPSULE ORAL DAILY
COMMUNITY

## 2022-09-01 RX ORDER — AMOXICILLIN 875 MG/1
TABLET, FILM COATED ORAL
Qty: 14 TABLET | Refills: 0 | Status: SHIPPED | OUTPATIENT
Start: 2022-09-01
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
## 2022-09-01 NOTE — PROGRESS NOTES
Edwardo Castellano is a 38 y.o. female (: 1980) presenting to address:    Chief Complaint   Patient presents with    Ear Pain     Right Ear feels full started on 22 , hears stuff when swallowing . Decreased hearing . tried mucinex , allergy pill , flonase. Fall     Dodie [place] 22 got a concussion and still has a bump would like it to be looked at today . Vitals:    22 1134   BP: 118/80   Pulse: 89   Resp: 16   Temp: 97.9 °F (36.6 °C)   TempSrc: Temporal   SpO2: 98%   Weight: 153 lb (69.4 kg)   Height: 5' 1.5\" (1.562 m)   PainSc:   5   PainLoc: Ear   LMP: 2022       Hearing/Vision:   No results found. Learning Assessment:     Learning Assessment 2014   PRIMARY LEARNER Patient   HIGHEST LEVEL OF EDUCATION - PRIMARY LEARNER  GRADUATED HIGH SCHOOL OR GED   BARRIERS PRIMARY LEARNER NONE   PRIMARY LANGUAGE ENGLISH   LEARNER PREFERENCE PRIMARY DEMONSTRATION   ANSWERED BY patient   RELATIONSHIP SELF     Depression Screening:     3 most recent PHQ Screens 2022   PHQ Not Done -   Little interest or pleasure in doing things Not at all   Feeling down, depressed, irritable, or hopeless Not at all   Total Score PHQ 2 0   Trouble falling or staying asleep, or sleeping too much -   Feeling tired or having little energy -   Poor appetite, weight loss, or overeating -   Feeling bad about yourself - or that you are a failure or have let yourself or your family down -   Trouble concentrating on things such as school, work, reading, or watching TV -   Moving or speaking so slowly that other people could have noticed; or the opposite being so fidgety that others notice -   Thoughts of being better off dead, or hurting yourself in some way -   PHQ 9 Score -   How difficult have these problems made it for you to do your work, take care of your home and get along with others -     Fall Risk Assessment:     Fall Risk Assessment, last 12 mths 7/15/2022   Able to walk?  Yes   Fall in past 12 months? 0 Do you feel unsteady? 0   Are you worried about falling 0     Abuse Screening:     Abuse Screening Questionnaire 7/15/2022   Do you ever feel afraid of your partner? N   Are you in a relationship with someone who physically or mentally threatens you? N   Is it safe for you to go home? Y     ADL Assessment:   No flowsheet data found. Coordination of Care Questionaire:   1. \"Have you been to the ER, urgent care clinic since your last visit? Hospitalized since your last visit? \" Yes 8/20/22     2. \"Have you seen or consulted any other health care providers outside of the [address] [place] [address] since your last visit? \" No     3. For patients aged 39-70: Has the patient had a colonoscopy? NA - based on age     If the patient is female:    4. For patients aged 41-77: Has the patient had a mammogram within the past 2 years? NA - based on age    11. For patients aged 21-65: Has the patient had a pap smear? Yes - no Care Gap present    Advanced Directive:   1. Do you have an Advanced Directive? NO    2. Would you like information on Advanced Directives?  NO

## 2022-09-01 NOTE — PATIENT INSTRUCTIONS
Assessment:  Right otitis media  Forehead contusion    Plan:  Begin amoxicillin 875 mg twice daily with food for 7 days  Apply warm wet compresses to area of swelling on the forehead  Follow-up for new symptoms, worsening symptoms or failure to improve

## 2022-09-07 DIAGNOSIS — B37.31 MONILIAL VAGINITIS: Primary | ICD-10-CM

## 2022-09-07 RX ORDER — FLUCONAZOLE 150 MG/1
150 TABLET ORAL DAILY
Qty: 1 TABLET | Refills: 0 | Status: SHIPPED | OUTPATIENT
Start: 2022-09-07 | End: 2022-09-08

## 2022-09-29 RX ORDER — ERGOCALCIFEROL 1.25 MG/1
CAPSULE ORAL
Qty: 12 CAPSULE | Refills: 0 | Status: SHIPPED | OUTPATIENT
Start: 2022-09-29

## 2022-11-08 ENCOUNTER — TELEPHONE (OUTPATIENT)
Dept: FAMILY MEDICINE CLINIC | Age: 42
End: 2022-11-08

## 2022-11-08 NOTE — TELEPHONE ENCOUNTER
Pt called wanting to have a prescription sent over to the pharmacy for UTI/Bladder infection. Pt stated that she is having frequent urination and seeing drops of blood in urine. Pt was informed that she would need an appt so that a urine specimen can be collected and Dr Freddie Hopkins could treat the issue with the correct medication. Pt stated that she has to pay every time that she comes in the office  and she already owes a bill. Pt would like a call back to find out if the medication could be sent to pharmacy.  Please advise

## 2022-11-09 ENCOUNTER — OFFICE VISIT (OUTPATIENT)
Dept: FAMILY MEDICINE CLINIC | Age: 42
End: 2022-11-09
Payer: COMMERCIAL

## 2022-11-09 VITALS
TEMPERATURE: 97.9 F | BODY MASS INDEX: 29.22 KG/M2 | WEIGHT: 158.8 LBS | OXYGEN SATURATION: 99 % | HEIGHT: 62 IN | DIASTOLIC BLOOD PRESSURE: 82 MMHG | SYSTOLIC BLOOD PRESSURE: 130 MMHG | HEART RATE: 89 BPM | RESPIRATION RATE: 15 BRPM

## 2022-11-09 DIAGNOSIS — B37.31 VAGINAL CANDIDIASIS: ICD-10-CM

## 2022-11-09 DIAGNOSIS — N39.0 URINARY TRACT INFECTION WITHOUT HEMATURIA, SITE UNSPECIFIED: ICD-10-CM

## 2022-11-09 DIAGNOSIS — R30.0 DYSURIA: Primary | ICD-10-CM

## 2022-11-09 LAB
BILIRUB UR QL STRIP: NEGATIVE
GLUCOSE UR-MCNC: NEGATIVE MG/DL
KETONES P FAST UR STRIP-MCNC: NEGATIVE MG/DL
PH UR STRIP: 6.5 [PH] (ref 4.6–8)
PROT UR QL STRIP: NEGATIVE
SP GR UR STRIP: 1.02 (ref 1–1.03)
UA UROBILINOGEN AMB POC: ABNORMAL (ref 0.2–1)
URINALYSIS CLARITY POC: CLEAR
URINALYSIS COLOR POC: YELLOW
URINE BLOOD POC: ABNORMAL
URINE LEUKOCYTES POC: ABNORMAL
URINE NITRITES POC: NEGATIVE

## 2022-11-09 PROCEDURE — 99213 OFFICE O/P EST LOW 20 MIN: CPT | Performed by: LEGAL MEDICINE

## 2022-11-09 PROCEDURE — 81003 URINALYSIS AUTO W/O SCOPE: CPT | Performed by: LEGAL MEDICINE

## 2022-11-09 RX ORDER — NITROFURANTOIN 25; 75 MG/1; MG/1
100 CAPSULE ORAL 2 TIMES DAILY
Qty: 14 CAPSULE | Refills: 0 | Status: SHIPPED | OUTPATIENT
Start: 2022-11-09 | End: 2022-11-16

## 2022-11-09 RX ORDER — FLUCONAZOLE 150 MG/1
150 TABLET ORAL
Qty: 2 TABLET | Refills: 0 | Status: SHIPPED | OUTPATIENT
Start: 2022-11-09 | End: 2022-11-17

## 2022-11-09 NOTE — PROGRESS NOTES
Mazin Whaley is a 38 y.o. female (: 1980) presenting to address:    Chief Complaint   Patient presents with    UTI       Vitals:    22 1144   BP: 130/82   Pulse: 89   Resp: 15   Temp: 97.9 °F (36.6 °C)   TempSrc: Temporal   SpO2: 99%   Weight: 158 lb 12.8 oz (72 kg)   Height: 5' 1.5\" (1.562 m)   PainSc:   8   PainLoc: Vagina   LMP: 10/20/2022       Hearing/Vision:   No results found. Learning Assessment:     Learning Assessment 2014   PRIMARY LEARNER Patient   HIGHEST LEVEL OF EDUCATION - PRIMARY LEARNER  GRADUATED HIGH SCHOOL OR GED   BARRIERS PRIMARY LEARNER NONE   PRIMARY LANGUAGE ENGLISH   LEARNER PREFERENCE PRIMARY DEMONSTRATION   ANSWERED BY patient   RELATIONSHIP SELF     Depression Screening:     3 most recent PHQ Screens 2022   PHQ Not Done -   Little interest or pleasure in doing things Not at all   Feeling down, depressed, irritable, or hopeless Not at all   Total Score PHQ 2 0   Trouble falling or staying asleep, or sleeping too much -   Feeling tired or having little energy -   Poor appetite, weight loss, or overeating -   Feeling bad about yourself - or that you are a failure or have let yourself or your family down -   Trouble concentrating on things such as school, work, reading, or watching TV -   Moving or speaking so slowly that other people could have noticed; or the opposite being so fidgety that others notice -   Thoughts of being better off dead, or hurting yourself in some way -   PHQ 9 Score -   How difficult have these problems made it for you to do your work, take care of your home and get along with others -     Fall Risk Assessment:     Fall Risk Assessment, last 12 mths 7/15/2022   Able to walk? Yes   Fall in past 12 months? 0   Do you feel unsteady? 0   Are you worried about falling 0     Abuse Screening:     Abuse Screening Questionnaire 7/15/2022   Do you ever feel afraid of your partner?  N   Are you in a relationship with someone who physically or mentally threatens you? N   Is it safe for you to go home? Y     ADL Assessment:   No flowsheet data found. Coordination of Care Questionaire:   1. \"Have you been to the ER, urgent care clinic since your last visit? Hospitalized since your last visit? \"  ER for COVID and concussion    2. \"Have you seen or consulted any other health care providers outside of the [address] [place] [address] since your last visit? \" No     3. For patients aged 39-70: Has the patient had a colonoscopy / FIT/ Cologuard? NA - based on age    If the patient is female:    4. For patients aged 41-77: Has the patient had a mammogram within the past 2 years? No  See top three    5. For patients aged 21-65: Has the patient had a pap smear? Yes - no Care Gap present    Advanced Directive:   1. Do you have an Advanced Directive? NO    2. Would you like information on Advanced Directives?  NO

## 2022-11-09 NOTE — PROGRESS NOTES
[address] [facility]     Chief Complaint   Patient presents with    UTI     Vitals:    11/09/22 1144   BP: 130/82   Pulse: 89   Resp: 15   Temp: 97.9 °F (36.6 °C)   TempSrc: Temporal   SpO2: 99%   Weight: 158 lb 12.8 oz (72 kg)   Height: 5' 1.5\" (1.562 m)   PainSc:   8   PainLoc: Vagina   LMP: 10/20/2022         HPI:Beba   is here today for painful urination and discomfort, with possible blood in the urine for 3 days   She noticed discharge first day no current  vaginal itching   Right ear discomfort was treated  2 month ago no hearing loss          Past Medical History:   Diagnosis Date    Anxiety     Depression     Mood disorder ([address] 4/10/2018    Near syncope     Otalgia     Tobacco abuse      Past Surgical History:   Procedure Laterality Date    HX LEEP PROCEDURE  02/2009     Social History     Tobacco Use    Smoking status: Every Day     Packs/day: 1.00     Types: Cigarettes    Smokeless tobacco: Never   Substance Use Topics    Alcohol use: No     Alcohol/week: 0.0 standard drinks     Comment: 375ml/day       Family History   Problem Relation Age of Onset    Other Mother         uterine polyps    COPD Mother        Review of Systems   Constitutional:  Negative for chills, fever, malaise/fatigue and weight loss. HENT:  Negative for congestion, ear discharge, ear pain, hearing loss and nosebleeds. Eyes:  Negative for blurred vision, double vision and discharge. Respiratory:  Negative for cough. Cardiovascular:  Negative for chest pain, palpitations, claudication and leg swelling. Gastrointestinal:  Negative for abdominal pain, constipation, diarrhea, nausea and vomiting. Genitourinary:  Positive for dysuria. Negative for frequency and urgency. Musculoskeletal:  Negative for myalgias. Skin:  Negative for itching and rash. Neurological:  Negative for dizziness, tingling, sensory change, speech change, focal weakness, weakness and headaches. Physical Exam  Vitals reviewed.    Constitutional: General: She is not in acute distress. Appearance: She is well-developed. She is not diaphoretic. HENT:      Head: Normocephalic and atraumatic. Eyes:      General: No scleral icterus. Right eye: No discharge. Left eye: No discharge. Neck:      Thyroid: No thyromegaly. Cardiovascular:      Rate and Rhythm: Normal rate and regular rhythm. Heart sounds: Normal heart sounds. No murmur heard. Pulmonary:      Effort: Pulmonary effort is normal. No respiratory distress. Breath sounds: Normal breath sounds. No wheezing or rales. Chest:      Chest wall: No tenderness. Abdominal:      General: There is no distension. Palpations: Abdomen is soft. Tenderness: There is no abdominal tenderness. There is no rebound. Musculoskeletal:         General: No tenderness or deformity. Normal range of motion. Lymphadenopathy:      Cervical: No cervical adenopathy. Skin:     General: Skin is warm and dry. Coloration: Skin is not pale. Findings: No erythema or rash. Neurological:      Mental Status: She is alert and oriented to person, place, and time. Coordination: Coordination normal.      Gait: Gait normal.   Psychiatric:         Mood and Affect: Mood normal.         Behavior: Behavior normal.         Thought Content: Thought content normal.         Judgment: Judgment normal.        Assessment and plan     Plan of care has been discussed with the patient, he agrees to the plan and verbalized understanding. All his questions were answered  More than 50% of the time spent in this visit was counseling the patient about  illness and treatment options         1. Dysuria  I gave the patient the option to wait for urine culture for treatment she opted to start treatment right away  Awaiting culture for cremate confirmation  - AMB POC URINALYSIS DIP STICK AUTO W/O MICRO  - CULTURE, URINE;  Future  - CULTURE, URINE  - nitrofurantoin, macrocrystal-monohydrate, (MACROBID) 100 mg capsule; Take 1 Capsule by mouth two (2) times a day for 7 days. Dispense: 14 Capsule; Refill: 0    2. Urinary tract infection without hematuria, site unspecified    - CULTURE, URINE; Future  - CULTURE, URINE  - nitrofurantoin, macrocrystal-monohydrate, (MACROBID) 100 mg capsule; Take 1 Capsule by mouth two (2) times a day for 7 days. Dispense: 14 Capsule; Refill: 0    3. Vaginal candidiasis  Last time patient when she had antibiotic she developed yeast infection that she think it still has not resolved completely  - fluconazole (DIFLUCAN) 150 mg tablet; Take 1 Tablet by mouth every seven (7) days for 2 doses. FDA advises cautious prescribing of oral fluconazole in pregnancy. Dispense: 2 Tablet; Refill: 0  Current Outpatient Medications   Medication Sig Dispense Refill    fluconazole (DIFLUCAN) 150 mg tablet Take 1 Tablet by mouth every seven (7) days for 2 doses. FDA advises cautious prescribing of oral fluconazole in pregnancy. 2 Tablet 0    nitrofurantoin, macrocrystal-monohydrate, (MACROBID) 100 mg capsule Take 1 Capsule by mouth two (2) times a day for 7 days. 14 Capsule 0    ergocalciferol (Vitamin D2) 1,250 mcg (50,000 unit) capsule take 1 capsule by mouth every 7 days 12 Capsule 0    omega 3-dha-epa-fish oil (Fish OiL) 100-160-1,000 mg cap Take  by mouth daily. metoprolol succinate (TOPROL-XL) 25 mg XL tablet Take 1 Tablet by mouth daily for 90 days. 90 Tablet 1    atorvastatin (LIPITOR) 10 mg tablet Take 1 Tablet by mouth daily for 90 days. 90 Tablet 3    Slynd 4 mg (28) tab Take 4 mg by mouth daily. ibuprofen 200 mg cap Take  by mouth as needed. valACYclovir (VALTREX) 500 mg tablet Take 4 tablets  12 hours apart today, then 4 tablets 12 hours apart at the first sign of fever blisters in the future 30 Tab 2    valACYclovir (VALTREX) 1 gram tablet Take 1 Tablet by mouth two (2) times a day for 5 days.  10 Tablet 5       Patient Active Problem List    Diagnosis Date Noted Mild depression 04/10/2018    Chronic midline low back pain without sciatica 04/10/2018    Odynophagia 07/20/2014    Tobacco abuse 07/20/2014    Alcohol abuse 07/20/2014    Chronic pharyngitis 07/20/2014    Otalgia      Results for orders placed or performed in visit on 11/09/22   AMB POC URINALYSIS DIP STICK AUTO W/O MICRO   Result Value Ref Range    Color (UA POC) Yellow     Clarity (UA POC) Clear     Glucose (UA POC) Negative Negative    Bilirubin (UA POC) Negative Negative    Ketones (UA POC) Negative Negative    Specific gravity (UA POC) 1.025 1.001 - 1.035    Blood (UA POC) Trace Negative    pH (UA POC) 6.5 4.6 - 8.0    Protein (UA POC) Negative Negative    Urobilinogen (UA POC) 0.2 mg/dL 0.2 - 1    Nitrites (UA POC) Negative Negative    Leukocyte esterase (UA POC) Trace Negative     Office Visit on 11/09/2022   Component Date Value Ref Range Status    Color (UA POC) 11/09/2022 Yellow   Final    Clarity (UA POC) 11/09/2022 Clear   Final    Glucose (UA POC) 11/09/2022 Negative  Negative Final    Bilirubin (UA POC) 11/09/2022 Negative  Negative Final    Ketones (UA POC) 11/09/2022 Negative  Negative Final    Specific gravity (UA POC) 11/09/2022 1.025  1.001 - 1.035 Final    Blood (UA POC) 11/09/2022 Trace  Negative Final    pH (UA POC) 11/09/2022 6.5  4.6 - 8.0 Final    Protein (UA POC) 11/09/2022 Negative  Negative Final    Urobilinogen (UA POC) 11/09/2022 0.2 mg/dL  0.2 - 1 Final    Nitrites (UA POC) 11/09/2022 Negative  Negative Final    Leukocyte esterase (UA POC) 11/09/2022 Trace  Negative Final          Follow-up and Dispositions    Return if symptoms worsen or fail to improve.

## 2022-11-10 ENCOUNTER — TELEPHONE (OUTPATIENT)
Dept: FAMILY MEDICINE CLINIC | Age: 42
End: 2022-11-10

## 2022-11-10 NOTE — TELEPHONE ENCOUNTER
----- Message from Julieta Pat sent at 11/10/2022 12:07 PM EST -----  Subject: Message to Provider    QUESTIONS  Information for Provider? pt called and stated she was seen 11/10 and said   since her symptoms have been worse. said she is having some blood in her   urine. said it hurts to urinate and she can feel pressure. said her right   kidney hurts and she was only urine tested when in office - asking not to   be charged for another office visit because she said more should of been   tested when she was in office on 11/9. pt asking for a callback   ---------------------------------------------------------------------------  --------------  [id number]  [phone]; OK to leave message on voicemail  ---------------------------------------------------------------------------  --------------  SCRIPT ANSWERS  Relationship to Patient? Self  Have you recently (14 days) seen a provider for this problem?  Yes

## 2022-11-10 NOTE — TELEPHONE ENCOUNTER
Pt called stating that symptoms that were addressed at yesterdays visit have worsened. States there is now blood in her urine and that her right kidney hurts. Pt was advised to go to the Er and pt expressed understanding. *Pt was prescribed medication at visit, I am unaware if she started taking it. No future appointments.

## 2022-11-10 NOTE — TELEPHONE ENCOUNTER
Urine culture has been sent results are pending  already has been started on antibiotics nitrofurantoin 100 mg twice daily did she already start taking the medication? ?   Also she was given Diflucan tablet    If patient is still having pain with urination she can use Azo over-the-counter, and to stay well-hydrated and drink at least 3 L of water daily

## 2022-11-11 LAB — RESULT: NORMAL

## 2022-12-22 RX ORDER — ERGOCALCIFEROL 1.25 MG/1
CAPSULE ORAL
Qty: 12 CAPSULE | Refills: 0 | Status: SHIPPED | OUTPATIENT
Start: 2022-12-22

## 2022-12-30 ENCOUNTER — TELEPHONE (OUTPATIENT)
Dept: FAMILY MEDICINE CLINIC | Age: 42
End: 2022-12-30

## 2022-12-30 NOTE — TELEPHONE ENCOUNTER
Patient called w/left leg pain, causing numbness; pt had CT of abdomen on 11/10/22 was informed, no kidney stones were present; pt is still experiencing abdominal pain; advised pt to go to ER for further eval/tx.

## 2023-02-10 DIAGNOSIS — I10 ESSENTIAL HYPERTENSION: ICD-10-CM

## 2023-03-03 RX ORDER — METOPROLOL SUCCINATE 25 MG/1
25 TABLET, EXTENDED RELEASE ORAL DAILY
Qty: 30 TABLET | Refills: 0 | Status: SHIPPED | OUTPATIENT
Start: 2023-03-03

## 2023-03-03 NOTE — TELEPHONE ENCOUNTER
Dr Gena Mcpherson will fill on Dr Faiza Yang since pt is out.      Future Appointments   Date Time Provider [facility]   4/4/2023  2:30 PM Rosalinda Paul MD BSMA BS AMB

## 2023-03-03 NOTE — TELEPHONE ENCOUNTER
[facility] called for their medication refill. Last Office visit:  11/9/2022    Last Filled: 7/15/2022; Qty 90 w/ 1 refill    Follow up visit:  No future appointments.

## 2023-03-03 NOTE — TELEPHONE ENCOUNTER
Pt has been out of medication for 3 days pt stated that [facility] sent request back on  Feb 1 st but they have not gotten a response

## 2023-04-04 ENCOUNTER — OFFICE VISIT (OUTPATIENT)
Dept: FAMILY MEDICINE CLINIC | Facility: CLINIC | Age: 43
End: 2023-04-04
Payer: MEDICAID

## 2023-04-04 VITALS
WEIGHT: 162 LBS | HEIGHT: 62 IN | TEMPERATURE: 98 F | RESPIRATION RATE: 16 BRPM | OXYGEN SATURATION: 98 % | BODY MASS INDEX: 29.81 KG/M2 | DIASTOLIC BLOOD PRESSURE: 82 MMHG | HEART RATE: 107 BPM | SYSTOLIC BLOOD PRESSURE: 120 MMHG

## 2023-04-04 DIAGNOSIS — F41.1 GENERALIZED ANXIETY DISORDER: ICD-10-CM

## 2023-04-04 DIAGNOSIS — B00.9 HERPESVIRAL INFECTION, UNSPECIFIED: ICD-10-CM

## 2023-04-04 DIAGNOSIS — E78.1 HYPERTRIGLYCERIDEMIA: ICD-10-CM

## 2023-04-04 DIAGNOSIS — I10 ESSENTIAL (PRIMARY) HYPERTENSION: ICD-10-CM

## 2023-04-04 DIAGNOSIS — F17.210 NICOTINE DEPENDENCE, CIGARETTES, UNCOMPLICATED: ICD-10-CM

## 2023-04-04 DIAGNOSIS — F10.10 ALCOHOL ABUSE: ICD-10-CM

## 2023-04-04 DIAGNOSIS — Z13.1 SCREENING FOR DIABETES MELLITUS (DM): ICD-10-CM

## 2023-04-04 DIAGNOSIS — F41.0 PANIC DISORDER (EPISODIC PAROXYSMAL ANXIETY): ICD-10-CM

## 2023-04-04 DIAGNOSIS — Z00.00 ANNUAL PHYSICAL EXAM: Primary | ICD-10-CM

## 2023-04-04 PROCEDURE — 99396 PREV VISIT EST AGE 40-64: CPT | Performed by: LEGAL MEDICINE

## 2023-04-04 PROCEDURE — 3079F DIAST BP 80-89 MM HG: CPT | Performed by: LEGAL MEDICINE

## 2023-04-04 PROCEDURE — 3074F SYST BP LT 130 MM HG: CPT | Performed by: LEGAL MEDICINE

## 2023-04-04 RX ORDER — ATORVASTATIN CALCIUM 10 MG/1
10 TABLET, FILM COATED ORAL DAILY
Qty: 90 TABLET | Refills: 3 | Status: SHIPPED | OUTPATIENT
Start: 2023-04-04 | End: 2023-07-03

## 2023-04-04 RX ORDER — METOPROLOL SUCCINATE 25 MG/1
25 TABLET, EXTENDED RELEASE ORAL DAILY
Qty: 90 TABLET | Refills: 3 | Status: SHIPPED | OUTPATIENT
Start: 2023-04-04 | End: 2023-07-03

## 2023-04-04 RX ORDER — LORAZEPAM 0.5 MG/1
0.5 TABLET ORAL 2 TIMES DAILY PRN
Qty: 30 TABLET | Refills: 0 | Status: SHIPPED | OUTPATIENT
Start: 2023-04-04 | End: 2023-05-04

## 2023-04-04 RX ORDER — VALACYCLOVIR HYDROCHLORIDE 1 G/1
1000 TABLET, FILM COATED ORAL 3 TIMES DAILY
Qty: 21 TABLET | Refills: 0 | Status: SHIPPED | OUTPATIENT
Start: 2023-04-04 | End: 2023-04-11

## 2023-04-04 SDOH — ECONOMIC STABILITY: INCOME INSECURITY: HOW HARD IS IT FOR YOU TO PAY FOR THE VERY BASICS LIKE FOOD, HOUSING, MEDICAL CARE, AND HEATING?: NOT HARD AT ALL

## 2023-04-04 SDOH — ECONOMIC STABILITY: FOOD INSECURITY: WITHIN THE PAST 12 MONTHS, YOU WORRIED THAT YOUR FOOD WOULD RUN OUT BEFORE YOU GOT MONEY TO BUY MORE.: NEVER TRUE

## 2023-04-04 SDOH — ECONOMIC STABILITY: HOUSING INSECURITY
IN THE LAST 12 MONTHS, WAS THERE A TIME WHEN YOU DID NOT HAVE A STEADY PLACE TO SLEEP OR SLEPT IN A SHELTER (INCLUDING NOW)?: NO

## 2023-04-04 SDOH — ECONOMIC STABILITY: FOOD INSECURITY: WITHIN THE PAST 12 MONTHS, THE FOOD YOU BOUGHT JUST DIDN'T LAST AND YOU DIDN'T HAVE MONEY TO GET MORE.: NEVER TRUE

## 2023-04-04 ASSESSMENT — PATIENT HEALTH QUESTIONNAIRE - PHQ9
6. FEELING BAD ABOUT YOURSELF - OR THAT YOU ARE A FAILURE OR HAVE LET YOURSELF OR YOUR FAMILY DOWN: 0
2. FEELING DOWN, DEPRESSED OR HOPELESS: 0
5. POOR APPETITE OR OVEREATING: 0
7. TROUBLE CONCENTRATING ON THINGS, SUCH AS READING THE NEWSPAPER OR WATCHING TELEVISION: 0
3. TROUBLE FALLING OR STAYING ASLEEP: 0
SUM OF ALL RESPONSES TO PHQ QUESTIONS 1-9: 0
SUM OF ALL RESPONSES TO PHQ9 QUESTIONS 1 & 2: 0
10. IF YOU CHECKED OFF ANY PROBLEMS, HOW DIFFICULT HAVE THESE PROBLEMS MADE IT FOR YOU TO DO YOUR WORK, TAKE CARE OF THINGS AT HOME, OR GET ALONG WITH OTHER PEOPLE: 0
SUM OF ALL RESPONSES TO PHQ QUESTIONS 1-9: 0
1. LITTLE INTEREST OR PLEASURE IN DOING THINGS: 0
4. FEELING TIRED OR HAVING LITTLE ENERGY: 0
SUM OF ALL RESPONSES TO PHQ QUESTIONS 1-9: 0
SUM OF ALL RESPONSES TO PHQ QUESTIONS 1-9: 0
8. MOVING OR SPEAKING SO SLOWLY THAT OTHER PEOPLE COULD HAVE NOTICED. OR THE OPPOSITE, BEING SO FIGETY OR RESTLESS THAT YOU HAVE BEEN MOVING AROUND A LOT MORE THAN USUAL: 0
9. THOUGHTS THAT YOU WOULD BE BETTER OFF DEAD, OR OF HURTING YOURSELF: 0

## 2023-04-04 ASSESSMENT — ENCOUNTER SYMPTOMS
APNEA: 0
SINUS PAIN: 0
WHEEZING: 0
BLOOD IN STOOL: 0
CONSTIPATION: 1
EYE DISCHARGE: 0
ANAL BLEEDING: 1
EYE REDNESS: 0
ABDOMINAL PAIN: 0
TROUBLE SWALLOWING: 0
BACK PAIN: 0
SORE THROAT: 0
SINUS PRESSURE: 0
FACIAL SWELLING: 0
EYE ITCHING: 0
CHEST TIGHTNESS: 0
NAUSEA: 0
ABDOMINAL DISTENTION: 0
CHOKING: 0
VOICE CHANGE: 0
COUGH: 0
SHORTNESS OF BREATH: 0
EYE PAIN: 0
VOMITING: 0
RECTAL PAIN: 0
DIARRHEA: 0
RHINORRHEA: 0

## 2023-04-04 ASSESSMENT — ANXIETY QUESTIONNAIRES
6. BECOMING EASILY ANNOYED OR IRRITABLE: 1
IF YOU CHECKED OFF ANY PROBLEMS ON THIS QUESTIONNAIRE, HOW DIFFICULT HAVE THESE PROBLEMS MADE IT FOR YOU TO DO YOUR WORK, TAKE CARE OF THINGS AT HOME, OR GET ALONG WITH OTHER PEOPLE: SOMEWHAT DIFFICULT
2. NOT BEING ABLE TO STOP OR CONTROL WORRYING: 1
3. WORRYING TOO MUCH ABOUT DIFFERENT THINGS: 1
5. BEING SO RESTLESS THAT IT IS HARD TO SIT STILL: 1
1. FEELING NERVOUS, ANXIOUS, OR ON EDGE: 1
GAD7 TOTAL SCORE: 7
7. FEELING AFRAID AS IF SOMETHING AWFUL MIGHT HAPPEN: 0
4. TROUBLE RELAXING: 2

## 2023-04-04 NOTE — PROGRESS NOTES
Ruslan Nolan is a 38 y.o. female (: 1980) presenting to address:    Chief Complaint   Patient presents with    Annual Exam              Vitals:    23 1353   BP: 120/82   Pulse: (!) 107   Resp: 16   Temp: 98 °F (36.7 °C)   SpO2: 98%       Coordination of Care Questionaire:   1. \"Have you been to the ER, urgent care clinic since your last visit? Hospitalized since your last visit? \" No    2. \"Have you seen or consulted any other health care providers outside of the [address] [place] [address] since your last visit? \" No     3. For patients aged 39-70: Has the patient had a colonoscopy / FIT/ Cologuard? NA - based on age      If the patient is female:    4. For patients aged 41-77: Has the patient had a mammogram within the past 2 years? Yes - no Care Gap present      5. For patients aged 21-65: Has the patient had a pap smear? Yes - no Care Gap present    Advanced Directive:   1. Do you have an Advanced Directive? No    2. Would you like information on Advanced Directives?  No
No wheezing or rhonchi. Abdominal:      General: There is no distension. Palpations: There is no mass. Tenderness: There is no abdominal tenderness. There is no right CVA tenderness, left CVA tenderness, guarding or rebound. Hernia: No hernia is present. Genitourinary:     Comments: Patient declined rectal exam  Musculoskeletal:         General: No tenderness. Normal range of motion. Cervical back: Normal range of motion and neck supple. No rigidity or tenderness. Right lower leg: No edema. Left lower leg: No edema. Skin:     Coloration: Skin is not jaundiced. Findings: No bruising, erythema, lesion or rash. Neurological:      General: No focal deficit present. Mental Status: She is alert and oriented to person, place, and time. Cranial Nerves: No cranial nerve deficit. Sensory: No sensory deficit. Motor: No weakness. Coordination: Coordination normal.      Gait: Gait normal.   Psychiatric:         Mood and Affect: Mood normal.         Thought Content: Thought content normal.         Judgment: Judgment normal.        Assessment and plan     Plan of care has been discussed with the patient, he agrees to the plan and verbalized understanding. All his questions were answered  More than 50% of the time spent in this visit was counseling the patient about  illness and treatment options         ICD-10-CM    1. Annual physical exam  Z00.00 atorvastatin (LIPITOR) 10 MG tablet     Lipid Panel     Comprehensive Metabolic Panel     CBC with Auto Differential     Hemoglobin A1C      2. Essential (primary) hypertension  I10 metoprolol succinate (TOPROL XL) 25 MG extended release tablet   Blood pressure has been well controlled  atorvastatin (LIPITOR) 10 MG tablet     Lipid Panel     Comprehensive Metabolic Panel      3.  Nicotine dependence, cigarettes, uncomplicated  C24.901    Strongly advised the patient to quit smoking she is not ready to quit smoking at

## 2023-04-06 LAB — DIRECT LDL: 65 MG/DL (ref 50–99)

## 2023-05-03 ENCOUNTER — OFFICE VISIT (OUTPATIENT)
Dept: FAMILY MEDICINE CLINIC | Facility: CLINIC | Age: 43
End: 2023-05-03
Payer: COMMERCIAL

## 2023-05-03 VITALS
HEART RATE: 81 BPM | DIASTOLIC BLOOD PRESSURE: 70 MMHG | WEIGHT: 151 LBS | BODY MASS INDEX: 27.79 KG/M2 | OXYGEN SATURATION: 97 % | RESPIRATION RATE: 15 BRPM | HEIGHT: 62 IN | SYSTOLIC BLOOD PRESSURE: 98 MMHG | TEMPERATURE: 98 F

## 2023-05-03 DIAGNOSIS — R79.89 ABNORMAL CBC: ICD-10-CM

## 2023-05-03 DIAGNOSIS — F10.10 ALCOHOL ABUSE: ICD-10-CM

## 2023-05-03 DIAGNOSIS — E78.1 HYPERTRIGLYCERIDEMIA: Primary | ICD-10-CM

## 2023-05-03 DIAGNOSIS — I10 ESSENTIAL (PRIMARY) HYPERTENSION: ICD-10-CM

## 2023-05-03 PROCEDURE — 3078F DIAST BP <80 MM HG: CPT | Performed by: LEGAL MEDICINE

## 2023-05-03 PROCEDURE — 99214 OFFICE O/P EST MOD 30 MIN: CPT | Performed by: LEGAL MEDICINE

## 2023-05-03 PROCEDURE — 3074F SYST BP LT 130 MM HG: CPT | Performed by: LEGAL MEDICINE

## 2023-05-03 RX ORDER — ATORVASTATIN CALCIUM 40 MG/1
40 TABLET, FILM COATED ORAL DAILY
Qty: 90 TABLET | Refills: 1 | Status: SHIPPED | OUTPATIENT
Start: 2023-05-03

## 2023-05-03 SDOH — ECONOMIC STABILITY: INCOME INSECURITY: HOW HARD IS IT FOR YOU TO PAY FOR THE VERY BASICS LIKE FOOD, HOUSING, MEDICAL CARE, AND HEATING?: NOT HARD AT ALL

## 2023-05-03 SDOH — ECONOMIC STABILITY: FOOD INSECURITY: WITHIN THE PAST 12 MONTHS, YOU WORRIED THAT YOUR FOOD WOULD RUN OUT BEFORE YOU GOT MONEY TO BUY MORE.: NEVER TRUE

## 2023-05-03 SDOH — ECONOMIC STABILITY: FOOD INSECURITY: WITHIN THE PAST 12 MONTHS, THE FOOD YOU BOUGHT JUST DIDN'T LAST AND YOU DIDN'T HAVE MONEY TO GET MORE.: NEVER TRUE

## 2023-05-03 ASSESSMENT — ENCOUNTER SYMPTOMS
WHEEZING: 0
ANAL BLEEDING: 0
EYE PAIN: 0
CONSTIPATION: 0
SORE THROAT: 0
BACK PAIN: 0
VOMITING: 0
ABDOMINAL PAIN: 0
NAUSEA: 0
EYE REDNESS: 0
APNEA: 0
FACIAL SWELLING: 0
EYE DISCHARGE: 0
EYE ITCHING: 0
DIARRHEA: 0
CHEST TIGHTNESS: 0
BLOOD IN STOOL: 0
SHORTNESS OF BREATH: 0
CHOKING: 0
COUGH: 0

## 2023-05-03 ASSESSMENT — PATIENT HEALTH QUESTIONNAIRE - PHQ9
3. TROUBLE FALLING OR STAYING ASLEEP: 0
SUM OF ALL RESPONSES TO PHQ9 QUESTIONS 1 & 2: 0
SUM OF ALL RESPONSES TO PHQ QUESTIONS 1-9: 0
1. LITTLE INTEREST OR PLEASURE IN DOING THINGS: 0
6. FEELING BAD ABOUT YOURSELF - OR THAT YOU ARE A FAILURE OR HAVE LET YOURSELF OR YOUR FAMILY DOWN: 0
8. MOVING OR SPEAKING SO SLOWLY THAT OTHER PEOPLE COULD HAVE NOTICED. OR THE OPPOSITE, BEING SO FIGETY OR RESTLESS THAT YOU HAVE BEEN MOVING AROUND A LOT MORE THAN USUAL: 0
2. FEELING DOWN, DEPRESSED OR HOPELESS: 0
9. THOUGHTS THAT YOU WOULD BE BETTER OFF DEAD, OR OF HURTING YOURSELF: 0
7. TROUBLE CONCENTRATING ON THINGS, SUCH AS READING THE NEWSPAPER OR WATCHING TELEVISION: 0
SUM OF ALL RESPONSES TO PHQ QUESTIONS 1-9: 0
SUM OF ALL RESPONSES TO PHQ QUESTIONS 1-9: 0
5. POOR APPETITE OR OVEREATING: 0
4. FEELING TIRED OR HAVING LITTLE ENERGY: 0
SUM OF ALL RESPONSES TO PHQ QUESTIONS 1-9: 0
10. IF YOU CHECKED OFF ANY PROBLEMS, HOW DIFFICULT HAVE THESE PROBLEMS MADE IT FOR YOU TO DO YOUR WORK, TAKE CARE OF THINGS AT HOME, OR GET ALONG WITH OTHER PEOPLE: 0

## 2023-05-03 NOTE — PROGRESS NOTES
Светлана Martin is a 38 y.o. female (: 1980) presenting to address:    Chief Complaint   Patient presents with    1 Month Follow-Up       Vitals:    23 1410   BP: 98/70   Pulse: 81   Resp: 15   Temp: 98 °F (36.7 °C)   SpO2: 97%       Coordination of Care Questionaire:   1. \"Have you been to the ER, urgent care clinic since your last visit? Hospitalized since your last visit? \" No    2. \"Have you seen or consulted any other health care providers outside of the [address] [place] [address] since your last visit? \" No     3. For patients aged 39-70: Has the patient had a colonoscopy / FIT/ Cologuard? NA - based on age      If the patient is female:    4. For patients aged 41-77: Has the patient had a mammogram within the past 2 years? Yes - Care Gap present. Most recent result on file      5. For patients aged 21-65: Has the patient had a pap smear? Yes - Care Gap present. Most recent result on file    Advanced Directive:   1. Do you have an Advanced Directive? No    2. Would you like information on Advanced Directives?  No
back pain without sciatica 04/10/2018    Mild depression 04/10/2018    Chronic pharyngitis 07/20/2014    Alcohol abuse 07/20/2014    Tobacco abuse 07/20/2014    Odynophagia 07/20/2014     No results found for this visit on 05/03/23.   Nurse Only on 04/27/2023   Component Date Value Ref Range Status    Triglycerides 04/27/2023 122  40 - 149 mg/dL Final    Comment: Recommended NCEP guidelines in mg/dL:   less than 150     Normal   150 - 199         Borderline high   200 - 499         High   greater than or = to 500  Very High       Nurse Only on 04/04/2023   Component Date Value Ref Range Status    Hemoglobin A1C 04/04/2023 5.0  4.8 - 5.6 % Final    AVERAGE GLUCOSE 04/04/2023 98  91 - 123 mg/dL Final    WBC 04/04/2023 9.4  4.0 - 11.0 K/uL Final    RBC 04/04/2023 3.72 (L)  3.80 - 5.20 M/uL Final    Hemoglobin 04/04/2023 13.0  11.7 - 15.5 g/dL Final    Hematocrit 04/04/2023 40.2  35.1 - 46.5 % Final    MCV 04/04/2023 108 (H)  80 - 99 fL Final    MCH 04/04/2023 35 (H)  26 - 34 pg Final    MCHC 04/04/2023 32  31 - 36 g/dL Final    RDW 04/04/2023 13.2  10.0 - 15.5 % Final    Platelets 26/98/6454 332  140 - 440 K/uL Final    MPV 04/04/2023 9.0  9.0 - 13.0 fL Final    Neutrophils 04/04/2023 55  40 - 75 % Final    Lymphocytes 04/04/2023 36  20 - 45 % Final    Monocytes 04/04/2023 6  3 - 12 % Final    Eosinophils 04/04/2023 2  0 - 6 % Final    Basophils 04/04/2023 1  0 - 2 % Final    Neutrophils Absolute 04/04/2023 5.2  1.8 - 7.7 K/uL Final    Lymphocytes Absolute 04/04/2023 3.4  1.0 - 4.8 K/uL Final    Monocytes Absolute 04/04/2023 0.6  0.1 - 1.0 K/uL Final    Eosinophils Absolute 04/04/2023 0.1  0.0 - 0.5 K/uL Final    Basophils Absolute 04/04/2023 0.1  0.0 - 0.2 K/uL Final    Glucose 04/04/2023 89  70 - 99 mg/dL Final    BUN 04/04/2023 9  6 - 22 mg/dL Final    Creatinine 04/04/2023 0.7  0.5 - 1.2 mg/dL Final    Sodium 04/04/2023 140  133 - 145 mmol/L Final    Potassium 04/04/2023 3.9  3.5 - 5.5 mmol/L Final    Chloride

## 2023-05-11 RX ORDER — METOPROLOL SUCCINATE 25 MG/1
TABLET, EXTENDED RELEASE ORAL
Qty: 90 TABLET | Refills: 1 | OUTPATIENT
Start: 2023-05-11

## 2023-05-26 RX ORDER — ERGOCALCIFEROL 1.25 MG/1
CAPSULE ORAL
Qty: 12 CAPSULE | Refills: 1 | Status: SHIPPED | OUTPATIENT
Start: 2023-05-26

## 2023-05-27 NOTE — TELEPHONE ENCOUNTER
patient need to take vitamin D supplements 50,000 unit weekly for 6 months, after finishing 6 months to take over-the-counter vitamin D 1000 units daily.

## 2023-05-30 RX ORDER — ATORVASTATIN CALCIUM 10 MG/1
TABLET, FILM COATED ORAL
Qty: 90 TABLET | OUTPATIENT
Start: 2023-05-30

## 2023-10-16 DIAGNOSIS — E78.1 HYPERTRIGLYCERIDEMIA: ICD-10-CM

## 2023-10-16 NOTE — TELEPHONE ENCOUNTER
[address] called for their medication refill. Last Office visit:  5/3/2023    Last Filled: 5/3/2023; Qty 90 w/ 1 refill     Follow up visit:  No future appointments.

## 2023-10-17 NOTE — TELEPHONE ENCOUNTER
What pharmacy would she like this sent to? Looks like her previous Rx's went to [facility] on [address], but this is not listed as an option so likely has closed.

## 2023-10-18 RX ORDER — ATORVASTATIN CALCIUM 40 MG/1
40 TABLET, FILM COATED ORAL DAILY
Qty: 90 TABLET | Refills: 1 | Status: SHIPPED | OUTPATIENT
Start: 2023-10-18

## 2023-11-22 ENCOUNTER — TELEPHONE (OUTPATIENT)
Dept: FAMILY MEDICINE CLINIC | Facility: CLINIC | Age: 43
End: 2023-11-22

## 2023-11-22 NOTE — TELEPHONE ENCOUNTER
Spoke with pt in regards to wait list informed pt that she is on the wait list and that once the provider is credentialed with her insurance we will call to schedule

## 2024-01-11 ENCOUNTER — TELEPHONE (OUTPATIENT)
Dept: FAMILY MEDICINE CLINIC | Facility: CLINIC | Age: 44
End: 2024-01-11

## 2024-04-29 DIAGNOSIS — I10 ESSENTIAL (PRIMARY) HYPERTENSION: ICD-10-CM

## 2024-04-29 RX ORDER — METOPROLOL SUCCINATE 25 MG/1
25 TABLET, EXTENDED RELEASE ORAL DAILY
Qty: 90 TABLET | Refills: 3 | Status: SHIPPED | OUTPATIENT
Start: 2024-04-29 | End: 2024-05-03 | Stop reason: ALTCHOICE

## 2024-04-29 NOTE — TELEPHONE ENCOUNTER
[facility] refill fax request:    Requested Prescriptions     Pending Prescriptions Disp Refills    metoprolol succinate (TOPROL XL) 25 MG extended release tablet 90 tablet 3     Sig: Take 1 tablet by mouth daily     Future Appointments   Date Time Provider Department Center   5/3/2024  2:00 PM Michele Sung DO [facility] BS AMB   8/5/2024 10:20 AM Allyson Steele PA [facility] Sched

## 2024-05-03 ENCOUNTER — OFFICE VISIT (OUTPATIENT)
Dept: FAMILY MEDICINE CLINIC | Facility: CLINIC | Age: 44
End: 2024-05-03

## 2024-05-03 VITALS
OXYGEN SATURATION: 98 % | SYSTOLIC BLOOD PRESSURE: 138 MMHG | RESPIRATION RATE: 15 BRPM | DIASTOLIC BLOOD PRESSURE: 88 MMHG | WEIGHT: 149.8 LBS | TEMPERATURE: 98 F | HEIGHT: 62 IN | BODY MASS INDEX: 27.57 KG/M2 | HEART RATE: 132 BPM

## 2024-05-03 DIAGNOSIS — E78.1 HYPERTRIGLYCERIDEMIA: ICD-10-CM

## 2024-05-03 DIAGNOSIS — N39.0 RECURRENT UTI: ICD-10-CM

## 2024-05-03 DIAGNOSIS — I10 ESSENTIAL (PRIMARY) HYPERTENSION: ICD-10-CM

## 2024-05-03 DIAGNOSIS — F10.10 ALCOHOL ABUSE: ICD-10-CM

## 2024-05-03 DIAGNOSIS — F41.0 PANIC DISORDER (EPISODIC PAROXYSMAL ANXIETY): ICD-10-CM

## 2024-05-03 DIAGNOSIS — Z00.00 ENCOUNTER FOR WELL ADULT EXAM WITHOUT ABNORMAL FINDINGS: Primary | ICD-10-CM

## 2024-05-03 DIAGNOSIS — E55.9 VITAMIN D DEFICIENCY, UNSPECIFIED: ICD-10-CM

## 2024-05-03 DIAGNOSIS — L30.9 DERMATITIS: ICD-10-CM

## 2024-05-03 DIAGNOSIS — R00.0 TACHYCARDIA: ICD-10-CM

## 2024-05-03 RX ORDER — METOPROLOL SUCCINATE 50 MG/1
50 TABLET, EXTENDED RELEASE ORAL DAILY
Qty: 90 TABLET | Refills: 1 | Status: SHIPPED | OUTPATIENT
Start: 2024-05-03

## 2024-05-03 RX ORDER — NITROFURANTOIN 25; 75 MG/1; MG/1
CAPSULE ORAL
Qty: 30 CAPSULE | Refills: 1 | Status: SHIPPED | OUTPATIENT
Start: 2024-05-03

## 2024-05-03 RX ORDER — TRAZODONE HYDROCHLORIDE 50 MG/1
50 TABLET ORAL NIGHTLY
Qty: 90 TABLET | Refills: 1 | Status: SHIPPED | OUTPATIENT
Start: 2024-05-03

## 2024-05-03 RX ORDER — TRIAMCINOLONE ACETONIDE 1 MG/G
CREAM TOPICAL
Qty: 15 G | Refills: 1 | Status: SHIPPED | OUTPATIENT
Start: 2024-05-03

## 2024-05-03 RX ORDER — ATORVASTATIN CALCIUM 40 MG/1
40 TABLET, FILM COATED ORAL DAILY
Qty: 90 TABLET | Refills: 3 | Status: SHIPPED | OUTPATIENT
Start: 2024-05-03

## 2024-05-03 SDOH — ECONOMIC STABILITY: FOOD INSECURITY: WITHIN THE PAST 12 MONTHS, YOU WORRIED THAT YOUR FOOD WOULD RUN OUT BEFORE YOU GOT MONEY TO BUY MORE.: NEVER TRUE

## 2024-05-03 SDOH — ECONOMIC STABILITY: FOOD INSECURITY: WITHIN THE PAST 12 MONTHS, THE FOOD YOU BOUGHT JUST DIDN'T LAST AND YOU DIDN'T HAVE MONEY TO GET MORE.: NEVER TRUE

## 2024-05-03 SDOH — ECONOMIC STABILITY: INCOME INSECURITY: HOW HARD IS IT FOR YOU TO PAY FOR THE VERY BASICS LIKE FOOD, HOUSING, MEDICAL CARE, AND HEATING?: NOT HARD AT ALL

## 2024-05-03 ASSESSMENT — ANXIETY QUESTIONNAIRES
IF YOU CHECKED OFF ANY PROBLEMS ON THIS QUESTIONNAIRE, HOW DIFFICULT HAVE THESE PROBLEMS MADE IT FOR YOU TO DO YOUR WORK, TAKE CARE OF THINGS AT HOME, OR GET ALONG WITH OTHER PEOPLE: SOMEWHAT DIFFICULT
2. NOT BEING ABLE TO STOP OR CONTROL WORRYING: SEVERAL DAYS
GAD7 TOTAL SCORE: 14
6. BECOMING EASILY ANNOYED OR IRRITABLE: SEVERAL DAYS
5. BEING SO RESTLESS THAT IT IS HARD TO SIT STILL: NEARLY EVERY DAY
7. FEELING AFRAID AS IF SOMETHING AWFUL MIGHT HAPPEN: NEARLY EVERY DAY
1. FEELING NERVOUS, ANXIOUS, OR ON EDGE: MORE THAN HALF THE DAYS
4. TROUBLE RELAXING: NEARLY EVERY DAY
3. WORRYING TOO MUCH ABOUT DIFFERENT THINGS: SEVERAL DAYS

## 2024-05-03 ASSESSMENT — PATIENT HEALTH QUESTIONNAIRE - PHQ9
1. LITTLE INTEREST OR PLEASURE IN DOING THINGS: NOT AT ALL
3. TROUBLE FALLING OR STAYING ASLEEP: NOT AT ALL
10. IF YOU CHECKED OFF ANY PROBLEMS, HOW DIFFICULT HAVE THESE PROBLEMS MADE IT FOR YOU TO DO YOUR WORK, TAKE CARE OF THINGS AT HOME, OR GET ALONG WITH OTHER PEOPLE: NOT DIFFICULT AT ALL
SUM OF ALL RESPONSES TO PHQ9 QUESTIONS 1 & 2: 0
SUM OF ALL RESPONSES TO PHQ QUESTIONS 1-9: 0
7. TROUBLE CONCENTRATING ON THINGS, SUCH AS READING THE NEWSPAPER OR WATCHING TELEVISION: NOT AT ALL
SUM OF ALL RESPONSES TO PHQ QUESTIONS 1-9: 0
6. FEELING BAD ABOUT YOURSELF - OR THAT YOU ARE A FAILURE OR HAVE LET YOURSELF OR YOUR FAMILY DOWN: NOT AT ALL
8. MOVING OR SPEAKING SO SLOWLY THAT OTHER PEOPLE COULD HAVE NOTICED. OR THE OPPOSITE, BEING SO FIGETY OR RESTLESS THAT YOU HAVE BEEN MOVING AROUND A LOT MORE THAN USUAL: NOT AT ALL
2. FEELING DOWN, DEPRESSED OR HOPELESS: NOT AT ALL
SUM OF ALL RESPONSES TO PHQ QUESTIONS 1-9: 0
SUM OF ALL RESPONSES TO PHQ QUESTIONS 1-9: 0
5. POOR APPETITE OR OVEREATING: NOT AT ALL
4. FEELING TIRED OR HAVING LITTLE ENERGY: NOT AT ALL
9. THOUGHTS THAT YOU WOULD BE BETTER OFF DEAD, OR OF HURTING YOURSELF: NOT AT ALL

## 2024-05-03 ASSESSMENT — ENCOUNTER SYMPTOMS
CONSTIPATION: 0
ABDOMINAL PAIN: 0
SORE THROAT: 0
SHORTNESS OF BREATH: 0
BACK PAIN: 0
DIARRHEA: 0
EYE PAIN: 0
COUGH: 0
RHINORRHEA: 0
NAUSEA: 0
CHEST TIGHTNESS: 0
VOMITING: 0

## 2024-05-03 NOTE — PROGRESS NOTES
Imelda Heredia is a 43 y.o. female (: 1980) presenting to address:    Chief Complaint   Patient presents with    Establish Care       Vitals:    24 1353   BP: 138/88   Pulse: (!) 132   Resp: 15   Temp: 98 °F (36.7 °C)   SpO2: 98%       \"Have you been to the ER, urgent care clinic since your last visit?  Hospitalized since your last visit?\"    NO    “Have you seen or consulted any other health care providers outside of [facility] since your last visit?”    NO             
Negative for dysuria and frequency.   Musculoskeletal:  Negative for arthralgias, back pain and myalgias.   Skin:  Negative for rash and wound.   Neurological:  Negative for dizziness and weakness.       /88 (Site: Left Upper Arm, Position: Sitting, Cuff Size: Medium Adult)   Pulse (!) 132   Temp 98 °F (36.7 °C) (Temporal)   Resp 15   Ht 1.562 m (5' 1.5\")   Wt 67.9 kg (149 lb 12.8 oz)   SpO2 98%   BMI 27.85 kg/m²     Physical Exam  Constitutional:       General: She is not in acute distress.     Appearance: Normal appearance. She is not ill-appearing or toxic-appearing.   HENT:      Head: Normocephalic and atraumatic.      Right Ear: Tympanic membrane, ear canal and external ear normal.      Left Ear: Tympanic membrane, ear canal and external ear normal.      Nose: Nose normal. No rhinorrhea.      Mouth/Throat:      Mouth: Mucous membranes are moist.      Pharynx: Oropharynx is clear. No posterior oropharyngeal erythema.   Eyes:      Extraocular Movements: Extraocular movements intact.      Conjunctiva/sclera: Conjunctivae normal.      Pupils: Pupils are equal, round, and reactive to light.   Cardiovascular:      Rate and Rhythm: Normal rate and regular rhythm.      Pulses: Normal pulses.      Heart sounds: Normal heart sounds. No murmur heard.     No friction rub. No gallop.   Pulmonary:      Effort: Pulmonary effort is normal. No respiratory distress.      Breath sounds: Normal breath sounds. No wheezing, rhonchi or rales.   Abdominal:      General: Abdomen is flat. Bowel sounds are normal.      Palpations: Abdomen is soft.      Tenderness: There is no abdominal tenderness. There is no guarding.   Musculoskeletal:         General: Normal range of motion.      Cervical back: Normal range of motion and neck supple. No tenderness.   Lymphadenopathy:      Cervical: No cervical adenopathy.   Skin:     General: Skin is warm.      Capillary Refill: Capillary refill takes less than 2 seconds.      Findings:

## 2024-07-18 ENCOUNTER — TELEPHONE (OUTPATIENT)
Dept: FAMILY MEDICINE CLINIC | Facility: CLINIC | Age: 44
End: 2024-07-18

## 2024-07-18 ENCOUNTER — LAB (OUTPATIENT)
Dept: FAMILY MEDICINE CLINIC | Facility: CLINIC | Age: 44
End: 2024-07-18

## 2024-07-18 DIAGNOSIS — I10 ESSENTIAL (PRIMARY) HYPERTENSION: ICD-10-CM

## 2024-07-18 DIAGNOSIS — Z00.00 ENCOUNTER FOR WELL ADULT EXAM WITHOUT ABNORMAL FINDINGS: ICD-10-CM

## 2024-07-18 DIAGNOSIS — E78.1 HYPERTRIGLYCERIDEMIA: ICD-10-CM

## 2024-07-18 DIAGNOSIS — E55.9 VITAMIN D DEFICIENCY, UNSPECIFIED: ICD-10-CM

## 2024-07-19 LAB
A/G RATIO: 1.9 RATIO (ref 1.1–2.6)
ALBUMIN: 4.6 G/DL (ref 3.5–5)
ALP BLD-CCNC: 76 U/L (ref 25–115)
ALT SERPL-CCNC: 66 U/L (ref 5–40)
ANION GAP SERPL CALCULATED.3IONS-SCNC: 14 MMOL/L (ref 3–15)
AST SERPL-CCNC: 69 U/L (ref 10–37)
BASOPHILS ABSOLUTE: 0.1 K/UL (ref 0–0.2)
BASOPHILS RELATIVE PERCENT: 1 % (ref 0–2)
BILIRUB SERPL-MCNC: 0.3 MG/DL (ref 0.2–1.2)
BUN BLDV-MCNC: 7 MG/DL (ref 6–22)
CALCIUM SERPL-MCNC: 9.4 MG/DL (ref 8.4–10.5)
CHLORIDE BLD-SCNC: 106 MMOL/L (ref 98–110)
CHOLESTEROL, TOTAL: 170 MG/DL (ref 110–200)
CHOLESTEROL/HDL RATIO: 2.5 (ref 0–5)
CO2: 23 MMOL/L (ref 20–32)
CREAT SERPL-MCNC: 0.6 MG/DL (ref 0.5–1.2)
CREATININE URINE: 219 MG/DL
EOSINOPHIL # BLD: 3 % (ref 0–6)
EOSINOPHILS ABSOLUTE: 0.1 K/UL (ref 0–0.5)
ESTIMATED AVERAGE GLUCOSE: 93 MG/DL (ref 91–123)
GFR, ESTIMATED: >60 ML/MIN/1.73 SQ.M.
GLOBULIN: 2.4 G/DL (ref 2–4)
GLUCOSE: 88 MG/DL (ref 70–99)
HBA1C MFR BLD: 4.9 % (ref 4.8–5.6)
HCT VFR BLD CALC: 41 % (ref 35.1–46.5)
HDLC SERPL-MCNC: 69 MG/DL
HEMOGLOBIN: 13.1 G/DL (ref 11.7–15.5)
LDL CHOLESTEROL: 65 MG/DL (ref 50–99)
LDL/HDL RATIO: 0.9
LYMPHOCYTES # BLD: 48 % (ref 20–45)
LYMPHOCYTES ABSOLUTE: 2.5 K/UL (ref 1–4.8)
MCH RBC QN AUTO: 34 PG (ref 26–34)
MCHC RBC AUTO-ENTMCNC: 32 G/DL (ref 31–36)
MCV RBC AUTO: 108 FL (ref 80–99)
MICROALB/CREAT RATIO (UG/MG CREAT.): 7.5 (ref 0–30)
MICROALBUMIN/CREAT 24H UR: 16.4 MG/L (ref 0.1–17)
MONOCYTES ABSOLUTE: 0.4 K/UL (ref 0.1–1)
MONOCYTES: 7 % (ref 3–12)
NEUTROPHILS ABSOLUTE: 2.2 K/UL (ref 1.8–7.7)
NEUTROPHILS: 42 % (ref 40–75)
NON-HDL CHOLESTEROL: 101 MG/DL
PDW BLD-RTO: 13.1 % (ref 10–15.5)
PLATELET # BLD: 283 K/UL (ref 140–440)
PMV BLD AUTO: 9.3 FL (ref 9–13)
POTASSIUM SERPL-SCNC: 3.8 MMOL/L (ref 3.5–5.5)
RBC # BLD: 3.81 M/UL (ref 3.8–5.2)
SODIUM BLD-SCNC: 143 MMOL/L (ref 133–145)
TOTAL PROTEIN: 7 G/DL (ref 6.4–8.3)
TRIGL SERPL-MCNC: 178 MG/DL (ref 40–149)
VITAMIN D 25-HYDROXY: 69.4 NG/ML (ref 32–100)
VLDLC SERPL CALC-MCNC: 36 MG/DL (ref 8–30)
WBC # BLD: 5.3 K/UL (ref 4–11)

## 2024-07-30 ENCOUNTER — TELEPHONE (OUTPATIENT)
Dept: FAMILY MEDICINE CLINIC | Facility: CLINIC | Age: 44
End: 2024-07-30

## 2024-07-30 NOTE — TELEPHONE ENCOUNTER
Pt scheduled on:   Future Appointments   Date Time Provider Department Center   8/13/2024  8:00 AM Michele Sung DO [facility] BS AMB

## 2024-07-30 NOTE — TELEPHONE ENCOUNTER
----- Message from Avinash Stevenson sent at 7/30/2024  2:43 PM EDT -----  Regarding: ECC Escalation To Practice  ECC Escalation To Practice      Type of Escalation: Acute Care Symptom  --------------------------------------------------------------------------------------------------------------------------    Information for Provider:  Patient is looking for appointment for: Symptom Back or Neck Pain  Reasons for Message: Unable to reach practice     Additional Information Pt is calling as she is having issue with my neck sharp pain on the right side of my neck on the base of my skull and wants to go over my test result    --------------------------------------------------------------------------------------------------------------------------    Relationship to Patient: Self     Call Back Info: OK to leave message on [employer]  Preferred Call Back Number: Phone [phone]

## 2024-08-12 NOTE — PROGRESS NOTES
[facility]      MR#: [id number]    HISTORY OF PRESENT ILLNESS  History of Present Illness  The patient is a 44-year-old female who presents with neck pain.    She experiences severe pain, rated 10 out of 10, on the right side of her neck, extending from the base of her skull to the end of her neck. This sharp pain has been progressively worsening over the past year. Despite her busy schedule with work and children, she has sought help from a chiropractor twice a week and tried various therapies, but these have not alleviated the sharp pain. The pain occasionally radiates into her shoulder blade. She also experiences a popping sensation in her neck when she moves it. Her physical activities are limited due to the pain, and she can only walk on the treadmill. She has tried using ice for relief but has not done so in recent weeks. The chiropractor has not provided any stretching exercises. She has used a vibrating chair that provides more space, but this has not helped with the sharp pain or popping. X-rays taken by the chiropractor revealed no curvature in her back, bone spurs, and immobility in her C1, C6, and C7 vertebrae. She also has sciatica that affects her left leg. She has not undergone physical therapy and prefers to focus on her neck issues. She hopes that chiropractic care will help with her back pain. She is not in favor of surgery and does not take muscle relaxers. She avoids NSAIDs due to concerns about her liver.    She has received her blood test results, which indicate elevated liver enzymes at 66 and 69, and an MCV of 108. She has been taking B complex drops and milk thistle for liver detoxification. She stopped consuming alcohol on 07/19/2024 and has improved her diet. Her anxiety has lessened, and she did not experience a panic attack during her blood pressure check. She was previously prescribed Zoloft, but after one dose, she experienced three days of nausea and loss of

## 2024-08-13 ENCOUNTER — OFFICE VISIT (OUTPATIENT)
Dept: FAMILY MEDICINE CLINIC | Facility: CLINIC | Age: 44
End: 2024-08-13
Payer: MEDICAID

## 2024-08-13 VITALS
DIASTOLIC BLOOD PRESSURE: 76 MMHG | HEART RATE: 89 BPM | TEMPERATURE: 97.4 F | BODY MASS INDEX: 27.05 KG/M2 | HEIGHT: 62 IN | OXYGEN SATURATION: 98 % | SYSTOLIC BLOOD PRESSURE: 110 MMHG | RESPIRATION RATE: 15 BRPM | WEIGHT: 147 LBS

## 2024-08-13 DIAGNOSIS — F41.0 PANIC DISORDER (EPISODIC PAROXYSMAL ANXIETY): ICD-10-CM

## 2024-08-13 DIAGNOSIS — L30.9 DERMATITIS: ICD-10-CM

## 2024-08-13 DIAGNOSIS — M54.2 CERVICALGIA: Primary | ICD-10-CM

## 2024-08-13 DIAGNOSIS — R74.8 ELEVATED LIVER ENZYMES: ICD-10-CM

## 2024-08-13 DIAGNOSIS — F41.1 GENERALIZED ANXIETY DISORDER: ICD-10-CM

## 2024-08-13 DIAGNOSIS — M62.838 MUSCLE SPASMS OF NECK: ICD-10-CM

## 2024-08-13 PROCEDURE — 99214 OFFICE O/P EST MOD 30 MIN: CPT | Performed by: STUDENT IN AN ORGANIZED HEALTH CARE EDUCATION/TRAINING PROGRAM

## 2024-08-13 PROCEDURE — 3074F SYST BP LT 130 MM HG: CPT | Performed by: STUDENT IN AN ORGANIZED HEALTH CARE EDUCATION/TRAINING PROGRAM

## 2024-08-13 PROCEDURE — 3078F DIAST BP <80 MM HG: CPT | Performed by: STUDENT IN AN ORGANIZED HEALTH CARE EDUCATION/TRAINING PROGRAM

## 2024-08-13 RX ORDER — TRIAMCINOLONE ACETONIDE 1 MG/G
CREAM TOPICAL
Qty: 15 G | Refills: 1 | Status: SHIPPED | OUTPATIENT
Start: 2024-08-13

## 2024-08-13 RX ORDER — DICLOFENAC SODIUM 75 MG/1
75 TABLET, DELAYED RELEASE ORAL 2 TIMES DAILY
Qty: 60 TABLET | Refills: 3 | Status: SHIPPED | OUTPATIENT
Start: 2024-08-13

## 2024-08-13 RX ORDER — VALACYCLOVIR HYDROCHLORIDE 1 G/1
1000 TABLET, FILM COATED ORAL 3 TIMES DAILY
Qty: 21 TABLET | Refills: 0 | Status: SHIPPED | OUTPATIENT
Start: 2024-08-13 | End: 2024-08-20

## 2024-08-13 RX ORDER — LORAZEPAM 0.5 MG/1
0.5 TABLET ORAL 2 TIMES DAILY PRN
Qty: 30 TABLET | Refills: 0 | Status: SHIPPED | OUTPATIENT
Start: 2024-08-13 | End: 2024-09-12

## 2024-08-13 RX ORDER — METHOCARBAMOL 500 MG/1
500 TABLET, FILM COATED ORAL 4 TIMES DAILY
Qty: 40 TABLET | Refills: 1 | Status: SHIPPED | OUTPATIENT
Start: 2024-08-13 | End: 2024-09-02

## 2024-08-13 ASSESSMENT — ENCOUNTER SYMPTOMS
SHORTNESS OF BREATH: 0
COUGH: 0
VOMITING: 0
NAUSEA: 0
DIARRHEA: 0
ABDOMINAL PAIN: 0
CONSTIPATION: 0
SORE THROAT: 0
EYE PAIN: 0
BACK PAIN: 1
RHINORRHEA: 0
CHEST TIGHTNESS: 0

## 2024-08-13 NOTE — PROGRESS NOTES
Imelda Heredia is a 44 y.o. female (: 1980) presenting to address:    Chief Complaint   Patient presents with    Results     Wants to discuss lab results; neck pain       Vitals:    24 0809   BP: 110/76   Pulse: 89   Resp: 15   Temp: 97.4 °F (36.3 °C)   SpO2: 98%       \"Have you been to the ER, urgent care clinic since your last visit?  Hospitalized since your last visit?\"    NO    “Have you seen or consulted any other health care providers outside of [facility] since your last visit?”    YES - When: approximately 1 days ago.  Where and Why: chiropractor 2 times per week.       Have you had a mammogram?”   NO; pt is scheduled on 24    No breast cancer screening on file

## 2024-08-27 ENCOUNTER — HOSPITAL ENCOUNTER (OUTPATIENT)
Facility: HOSPITAL | Age: 44
Setting detail: RECURRING SERIES
Discharge: HOME | End: 2024-08-30
Payer: MEDICAID

## 2024-08-27 PROCEDURE — 97162 PT EVAL MOD COMPLEX 30 MIN: CPT

## 2024-08-27 NOTE — THERAPY EVALUATION
Injection (1 or 2 muscles), (3+ muscles)  Patient / Family readiness to learn indicated by: asking questions, trying to perform skills, interest, return verbalization , and return demonstration   Persons(s) to be included in education: patient (P)  Barriers to Learning/Limitations: none  Measures taken if barriers to learning present: -  Patient Goal (s): \"no more sharp pain, ability to move without pain\"  Patient Self Reported Health Status: fair  Rehabilitation Potential: good    Short Term Goals: To be accomplished in 4 weeks  Pt to report adherence and demonstrate compliance with basic HEP to allow progress between visits  2.  Pt to report pain <4/10 at worst to allow improved function and QOL (10/10 at worst at Eval)  3. Pt to report non-disturbed sleep of 7hrs to allow improved healing and QOL  4. Pt to report pain is not constant, is intermittent and has a changed frequency and duration. (Constant at Eval)    Long Term Goals: To be accomplished in 8 weeks  Pt to improve NDI to 35% indicating improved function in daily tasks (52% at Eval)  2.  Pt to indicate a Global Rating Of Change (GROC) of 4+ indicating \"moderately better\"  3. Pt to demonstrate improve cervical Rotation AROM to 75+ deg to allow improved looking and driving. (Eval: Left: 85 deg, R: 62 deg P!)  4. Pt to report no longer having \"sharp\" pains at the base of the skull to indicate improved status.    Frequency / Duration: Patient to be seen 2 times per week for 8 weeks    Patient/ Caregiver education and instruction: Diagnosis, prognosis,  self care, activity modification, and exercises  [x]  Plan of care has been reviewed with JESS prn.    Certification Period: n/a    Gavin Torres, PT DPT, OCS, Cert-DN      8/27/2024       10:02 AM    Payor: [facility] MEDICAID / Plan: [facility] CARDINAL CARE / Product Type: *No Product type* /     Physician signature required for Medicare, Medicaid, Worker's Comp, Direct Access

## 2024-08-27 NOTE — PROGRESS NOTES
PHYSICAL THERAPY - DAILY TREATMENT NOTE    Patient Name: Imelda Heredia    Date: 2024    : 1980  Insurance: Payor: [facility] MEDICAID / Plan: [facility] C[facility]INAL C[facility] / Product Type: *No Product type* /      Patient  verified YES   Visit #   Current / Total 1 16   Time   In / Out 3:40 4:20   Pain   In / Out 7 6   Subjective Functional Status/Changes: SEE EVALUATION     TREATMENT AREA = Neck pain [M54.2]    OBJECTIVE        Therapeutic Procedures:  Tx Min Billable or 1:1 Min (if diff from Tx Min) Procedure, Rationale, Specifics   7  41277 Self Care/Home Management (timed):  improve patient knowledge and understanding of activity modification, diagnosis/prognosis, and physical therapy expectations, procedures and progression  to improve patient's ability to progress to PLOF and address remaining functional goals.  (see flow sheet as applicable)     Details if applicable:  Included MT of: Intervention: SOR, DTM holds to right C4/5 facets, Upper c/s HVLAT with cavitations bilaterally     7 40 MC BC Totals Reminder: bill using total billable min of TIMED therapeutic procedures (example: do not include dry needle or estim unattended, both untimed codes, in totals to left)  8-22 min = 1 unit; 23-37 min = 2 units; 38-52 min = 3 units; 53-67 min = 4 units; 68-82 min = 5 units   Total Total     [x]  Patient Education billed concurrently with other procedures   [x] Review HEP    [] Progressed/Changed HEP, detail:    [] Other detail:       Objective Information/Functional Measures/Assessment    SEE EVALUATION    Patient will benefit from skilled PT services to  modify and progress therapeutic interventions, analyze and address functional mobility deficits, analyze and address ROM deficits, analyze and address strength deficits, analyze and address soft tissue restrictions, analyze and cue for proper movement patterns, and analyze and modify for postural abnormalities to address functional deficits

## 2024-09-11 ENCOUNTER — HOSPITAL ENCOUNTER (OUTPATIENT)
Facility: HOSPITAL | Age: 44
Setting detail: RECURRING SERIES
Discharge: HOME | End: 2024-09-14
Payer: MEDICAID

## 2024-09-11 PROCEDURE — 97535 SELF CARE MNGMENT TRAINING: CPT

## 2024-09-11 PROCEDURE — 97110 THERAPEUTIC EXERCISES: CPT

## 2024-09-11 PROCEDURE — 97112 NEUROMUSCULAR REEDUCATION: CPT

## 2024-09-11 PROCEDURE — 97530 THERAPEUTIC ACTIVITIES: CPT

## 2024-09-12 ENCOUNTER — TELEPHONE (OUTPATIENT)
Dept: FAMILY MEDICINE CLINIC | Facility: CLINIC | Age: 44
End: 2024-09-12

## 2024-09-12 DIAGNOSIS — N76.0 ACUTE VAGINITIS: Primary | ICD-10-CM

## 2024-09-12 RX ORDER — METRONIDAZOLE 500 MG/1
500 TABLET ORAL 2 TIMES DAILY
Qty: 14 TABLET | Refills: 0 | Status: SHIPPED | OUTPATIENT
Start: 2024-09-12 | End: 2024-09-19

## 2024-09-12 NOTE — TELEPHONE ENCOUNTER
Pt calling about a medication for BV.  She said she took fluconazole (1 tablet) when she thought it was a yeast infection.  She said the symptoms are burning but started out as itching.  Pt wondering if something can be prescribed without coming in for an appt. She said she does not believe it is a UTI, because it is the labia burning and she said she is familiar w/ the UTI burning.  Pt did want us to know she doesn't have access to Mychart at the moment.

## 2024-09-16 ENCOUNTER — HOSPITAL ENCOUNTER (OUTPATIENT)
Facility: HOSPITAL | Age: 44
Setting detail: RECURRING SERIES
End: 2024-09-16
Payer: MEDICAID

## 2024-09-18 ENCOUNTER — HOSPITAL ENCOUNTER (OUTPATIENT)
Facility: HOSPITAL | Age: 44
Setting detail: RECURRING SERIES
Discharge: HOME | End: 2024-09-21
Payer: MEDICAID

## 2024-09-18 PROCEDURE — 97112 NEUROMUSCULAR REEDUCATION: CPT

## 2024-09-18 PROCEDURE — 97110 THERAPEUTIC EXERCISES: CPT

## 2024-09-18 PROCEDURE — 97530 THERAPEUTIC ACTIVITIES: CPT

## 2024-09-24 ENCOUNTER — HOSPITAL ENCOUNTER (OUTPATIENT)
Facility: HOSPITAL | Age: 44
Setting detail: RECURRING SERIES
Discharge: HOME | End: 2024-09-27
Payer: MEDICAID

## 2024-09-24 PROCEDURE — 97535 SELF CARE MNGMENT TRAINING: CPT

## 2024-09-24 PROCEDURE — 97110 THERAPEUTIC EXERCISES: CPT

## 2024-09-24 PROCEDURE — 97112 NEUROMUSCULAR REEDUCATION: CPT

## 2024-09-24 PROCEDURE — 97530 THERAPEUTIC ACTIVITIES: CPT

## 2024-09-26 ENCOUNTER — HOSPITAL ENCOUNTER (OUTPATIENT)
Facility: HOSPITAL | Age: 44
Setting detail: RECURRING SERIES
Discharge: HOME | End: 2024-09-29
Payer: MEDICAID

## 2024-09-26 PROCEDURE — 97530 THERAPEUTIC ACTIVITIES: CPT

## 2024-09-26 PROCEDURE — 97110 THERAPEUTIC EXERCISES: CPT

## 2024-09-26 PROCEDURE — 97535 SELF CARE MNGMENT TRAINING: CPT

## 2024-09-26 PROCEDURE — 97112 NEUROMUSCULAR REEDUCATION: CPT

## 2024-09-26 NOTE — THERAPY RECERTIFICATION
[facility]  [address], [address],[place] [address] - Ph: [phone]  Fx: [phone]  PHYSICAL THERAPY PROGRESS NOTE  Patient Name: Imelda Heredia : 1980   Treatment/Medical Diagnosis: Neck pain [M54.2] / M54.2  NECK PAIN    Referral Source: Michele Sung DO     Date of Initial Visit: 24 Attended Visits: 5 Missed Visits: 0     SUMMARY OF TREATMENT  Pt seen in clinic for to address Neck pain [M54.2]. Pt has been assessed, completed therapeutic exercises, neuromuscular re-ed, therapeutic functional activity, received manual therapy intervention, self-care strategies, HEP techniques and pt ed on condition consistency and follow-through. -    Subjective: Pt reports 0% of where patient wants to be. Pt reports improvements in not much. Pt reports continued difficulties in constant neck pain, poor sleep and sharp upper neck pain and clicking.     CURRENT STATUS  Pt seen for 5 visits to address neck pain, upper cervical clicking and sharp pains. Minimal / no change in pt presentation to date. Pt with good effort in session and seemingly doing well with some resistance drills. Improved cervical stability may improve pt condition. Will continue to progress this patient as able.     GOALS:  Short Term Goals: To be accomplished in 4 weeks  Pt to report adherence and demonstrate compliance with basic HEP to allow progress between visits Current: Really trying to do them (24)  2.  Pt to report pain <4/10 at worst to allow improved function and QOL (10/10 at worst at Eval) Current: 6-8/10, No change, 24  3. Pt to report non-disturbed sleep of 7hrs to allow improved healing and QOL Current: Sleep remains very disturbed, Not Met (24)  4. Pt to report pain is not constant, is intermittent and has a changed frequency and duration. (Constant at Eval) Current: Still constant (24)     Long Term Goals: To be accomplished in 8

## 2024-09-26 NOTE — PROGRESS NOTES
PHYSICAL THERAPY - DAILY TREATMENT NOTE    Patient Name: Imelda Heredia    Date: 2024    : 1980  Insurance: Payor: [facility] MEDICAID / Plan: [facility] PLAN CARDINAL CARE / Product Type: *No Product type* /      Patient  verified YES   Visit #   Current / Total 5 16   Time   In / Out 5:00 5:45   Pain   In / Out 6 6   Subjective Functional Status/Changes:   Pt reports 0% of where patient wants to be. Pt reports improvements in not much. Pt reports continued difficulties in constant neck pain, poor sleep and sharp upper neck pain and clicking.          TREATMENT AREA =  Neck pain [M54.2]    OBJECTIVE    Therapeutic Procedures:  Tx Min Billable or 1:1 Min (if diff from Tx Min) Procedure, Rationale, Specifics   10  58578 Therapeutic Activity (timed):  use of dynamic activities replicating functional movements to increase ROM, strength, coordination, balance, and proprioception in order to improve patient's ability to progress to PLOF and address remaining functional goals.  (see flow sheet as applicable)     Details if applicable:      Practice achieving optimal posture and ergonomics in simulated work set up   10  13527 Neuromuscular Re-Education (timed):  improve balance, coordination, kinesthetic sense, posture, core stability and proprioception to improve patient's ability to develop conscious control of individual muscles and awareness of position of extremities in order to progress to PLOF and address remaining functional goals. (see flow sheet as applicable)     Details if applicable:      Lumbar and cervical posture and awareness in sitting; see flowsheet   5  93362 Manual Therapy (timed):  decrease pain, increase ROM, increase tissue extensibility, decrease trigger points, and increase postural awareness to improve patient's ability to progress to PLOF and address remaining functional goals.  The manual therapy interventions were performed at a separate and distinct time from the

## 2024-10-01 ENCOUNTER — HOSPITAL ENCOUNTER (OUTPATIENT)
Facility: HOSPITAL | Age: 44
Setting detail: RECURRING SERIES
Discharge: HOME | End: 2024-10-04
Payer: MEDICAID

## 2024-10-01 PROCEDURE — 97530 THERAPEUTIC ACTIVITIES: CPT

## 2024-10-01 PROCEDURE — 97110 THERAPEUTIC EXERCISES: CPT

## 2024-10-01 PROCEDURE — 97112 NEUROMUSCULAR REEDUCATION: CPT

## 2024-10-01 PROCEDURE — 97012 MECHANICAL TRACTION THERAPY: CPT

## 2024-10-01 NOTE — PROGRESS NOTES
not include dry needle or estim unattended, both untimed codes, in totals to left)  8-22 min = 1 unit; 23-37 min = 2 units; 38-52 min = 3 units; 53-67 min = 4 units; 68-82 min = 5 units   Total Total     [x]  Patient Education billed concurrently with other procedures   [x] Review HEP    [] Progressed/Changed HEP, detail:  Access Code: [email or web address]  URL: [email or web address]    [] Other detail:       HEP expanded: Access Code: [email or web address]  URL: [email or web address]  Date: 09/24/2024  Prepared by: Gavin Torres    Objective Information/Functional Measures/Assessment    Started with slow AROM for proprioception. Followed by global appropriate strength drills for shoulders and upper back and chest. See flowsheet for drills loads and volume. MT complete and right sided hypomobility noted mid c/s. Trial of Mechanical Traction for the neck this evening. Neck feels losser after session. Still reporting pain. No adverse events noted.     Patient will continue to benefit from skilled PT services to modify and progress therapeutic interventions, analyze and address functional mobility deficits, analyze and address ROM deficits, analyze and address strength deficits, analyze and address soft tissue restrictions, analyze and cue for proper movement patterns, and analyze and modify for postural abnormalities to address functional deficits and attain remaining goals.    Progress toward goals / Updated goals:  []  See Progress Note/Recertification  Next PN / RC due: 10/26/24  Auth due: 16 v thru 10/25/24    Short Term Goals: To be accomplished in 4 weeks  Pt to report adherence and demonstrate compliance with basic HEP to allow progress between visits Current: Completed every other day (10/1/24)  2.  Pt to report pain <4/10 at worst to allow improved function and QOL (10/10 at worst at Eval) Current: 6-8/10, No change, 9/26/24  3. Pt to report non-disturbed sleep of 7hrs to allow improved

## 2024-10-02 ENCOUNTER — TELEPHONE (OUTPATIENT)
Dept: FAMILY MEDICINE CLINIC | Facility: CLINIC | Age: 44
End: 2024-10-02

## 2024-10-02 DIAGNOSIS — M54.2 CERVICALGIA: Primary | ICD-10-CM

## 2024-10-02 NOTE — TELEPHONE ENCOUNTER
Informed pt MRI ordered; pt states previous xrays were through her chiropractor and pt paid out of pocket; pt is asking if she should get xrays through her insurance with an order from PCP.

## 2024-10-02 NOTE — TELEPHONE ENCOUNTER
Reviewed previous note.  Recommmend MRI if no improvement with PT as discussed.  Previously has had xrays.

## 2024-10-02 NOTE — TELEPHONE ENCOUNTER
----- Message from Татьяна CAMEJO sent at 10/2/2024 10:18 AM EDT -----  Regarding: ECC Referral Request  ECC Referral Request    Reason for referral request: Lab/Test Order    Specialist/Lab/Test patient is requesting (if known): CAT Scan     Specialist Phone Number (if applicable):    Additional Information Patient is requesting to have a referral for a CAT Scan or X- ray so that she can be check with her condition.   -------------------------------------------------------------------------------------------------------------------------    Relationship to Patient: Self     Call Back Information: OK to leave message on voicemail  Preferred Call Back Number: Phone [phone] (home)

## 2024-10-02 NOTE — TELEPHONE ENCOUNTER
Pt is going to physical therapy and feels it is not making a difference; pt is requesting imaging order; pt has completed 6 sessions and is scheduled for session #7 tomorrow.

## 2024-10-03 ENCOUNTER — HOSPITAL ENCOUNTER (OUTPATIENT)
Facility: HOSPITAL | Age: 44
Setting detail: RECURRING SERIES
Discharge: HOME | End: 2024-10-06
Payer: MEDICAID

## 2024-10-03 PROCEDURE — 97110 THERAPEUTIC EXERCISES: CPT

## 2024-10-03 PROCEDURE — 97112 NEUROMUSCULAR REEDUCATION: CPT

## 2024-10-03 PROCEDURE — 97530 THERAPEUTIC ACTIVITIES: CPT

## 2024-10-03 NOTE — THERAPY RECERTIFICATION
[facility]  [address] [address] - Ph: [phone]  Fx: [phone]  PATIENT UPDATE  Patient Name: Imelda Heredia : 1980   Treatment/Medical Diagnosis: Neck pain [M54.2] / M54.2, G89.29  CHRONIC NECK PAIN    Referral Source: Michele Sung DO     Date of Initial Visit: 24 Attended Visits: 7 Missed Visits: 1       CURRENT STATUS  Pt without any noted change in presentation to date. Due to this I am placing patient on hold awaiting MRI and physician follow-up. Anticipate return to clinic in late October.     GOALS:  Short Term Goals: To be accomplished in 4 weeks  Pt to report adherence and demonstrate compliance with basic HEP to allow progress between visits Current: Completed every other day (10/3/24)  2.  Pt to report pain <4/10 at worst to allow improved function and QOL (10/10 at worst at Eval) Current: 6-8/10, No change, 24  3. Pt to report non-disturbed sleep of 7hrs to allow improved healing and QOL Current: Sleep remains very disturbed, Not Met (24)  4. Pt to report pain is not constant, is intermittent and has a changed frequency and duration. (Constant at Eval) Current: Still constant (24)     Long Term Goals: To be accomplished in 8 weeks  Pt to improve NDI to 35% indicating improved function in daily tasks (52% at Eval) Current: 46% progressing (24)  2.  Pt to indicate a Global Rating Of Change (GROC) of 4+ indicating \"moderately better\" Current: 0 \"no change\" (24)  3. Pt to demonstrate improve cervical Rotation AROM to 75+ deg to allow improved looking and driving. (Eval: Left: 85 deg, R: 62 deg P!) Current: Left 78 deg, Right 67 deg P! Upon return  4. Pt to report no longer having \"sharp\" pains at the base of the skull to indicate improved status. Not Met (24)    RECOMMENDATIONS  Pt on hold awaiting MRI and physician follow-up.    Reporting Period (date from last

## 2024-10-03 NOTE — PROGRESS NOTES
SOR, UT MFR, PA sweeps, right UPAs   10  81357 Therapeutic Activity (timed):  use of dynamic activities replicating functional movements to increase ROM, strength, coordination, balance, and proprioception in order to improve patient's ability to progress to PLOF and address remaining functional goals.  (see flow sheet as applicable)     Details if applicable:       52360 Self Care/Home Management (timed):  improve patient knowledge and understanding of pain reducing techniques, positioning, activity modification, and physical therapy expectations, procedures and progression  to improve patient's ability to progress to PLOF and address remaining functional goals.  (see flow sheet as applicable)     Details if applicable:  includes assessment and POC discussion   40  MC BC Totals Reminder: bill using total billable min of TIMED therapeutic procedures (example: do not include dry needle or estim unattended, both untimed codes, in totals to left)  8-22 min = 1 unit; 23-37 min = 2 units; 38-52 min = 3 units; 53-67 min = 4 units; 68-82 min = 5 units   Total Total     [x]  Patient Education billed concurrently with other procedures   [x] Review HEP    [] Progressed/Changed HEP, detail:  Access Code: [email or web address]  URL: [email or web address]    [] Other detail:       HEP expanded: Access Code: [email or web address]  URL: [email or web address]  Date: 09/24/2024  Prepared by: Gavin Torres    Objective Information/Functional Measures/Assessment    Started with MT to address pains then to flowsheet drills to improve pt status. Pt without any noted change in presentation to date. Due to this I am placing patient on hold awaiting MRI and physician follow-up. Anticipate return to clinic in late October.     Patient will continue to benefit from skilled PT services to modify and progress therapeutic interventions, analyze and address functional mobility deficits, analyze and address ROM deficits,

## 2024-10-07 ENCOUNTER — TELEPHONE (OUTPATIENT)
Dept: FAMILY MEDICINE CLINIC | Facility: CLINIC | Age: 44
End: 2024-10-07

## 2024-10-07 NOTE — TELEPHONE ENCOUNTER
----- Message from Teresita LEON sent at 10/4/2024  4:32 PM EDT -----  Regarding: ECC Message to Provider  ECC Message to Provider    Relationship to Patient: Self     Additional Information : Caller is requesting for a medical record of the patient for the MRI prior  authorization and needs to be sent  to [phone].  --------------------------------------------------------------------------------------------------------------------------    Call Back Information: OK to leave message on voicemail  Preferred Call Back Number: Phone : [phone]

## 2024-10-08 ENCOUNTER — APPOINTMENT (OUTPATIENT)
Facility: HOSPITAL | Age: 44
End: 2024-10-08
Payer: MEDICAID

## 2024-10-10 ENCOUNTER — APPOINTMENT (OUTPATIENT)
Facility: HOSPITAL | Age: 44
End: 2024-10-10
Payer: MEDICAID

## 2024-10-15 ENCOUNTER — APPOINTMENT (OUTPATIENT)
Facility: HOSPITAL | Age: 44
End: 2024-10-15
Payer: MEDICAID

## 2024-10-17 ENCOUNTER — APPOINTMENT (OUTPATIENT)
Facility: HOSPITAL | Age: 44
End: 2024-10-17
Payer: MEDICAID

## 2024-10-22 ENCOUNTER — APPOINTMENT (OUTPATIENT)
Facility: HOSPITAL | Age: 44
End: 2024-10-22
Payer: MEDICAID

## 2024-10-24 ENCOUNTER — APPOINTMENT (OUTPATIENT)
Facility: HOSPITAL | Age: 44
End: 2024-10-24
Payer: MEDICAID

## 2024-10-24 ENCOUNTER — TELEPHONE (OUTPATIENT)
Dept: FAMILY MEDICINE CLINIC | Facility: CLINIC | Age: 44
End: 2024-10-24

## 2024-10-24 NOTE — TELEPHONE ENCOUNTER
PCP ordered MRI for pt; MRI was scheduled before authorization was approved; pt completed MRI on 10/16/24; pt received notification that MRI was not approved on 10/17/24; spoke w/[employer] representative who states pts are notified 48 hours before the scheduled date if the authorization was not received to give the option of keeping the scheduled appt; will contact insurance to see what next step is for appeal.

## 2024-10-24 NOTE — PROGRESS NOTES
MRI of cervical spine shows C5-C6 mild left-sided facet joint hypertrophy contributing to moderate foraminal narrowing.  No disc herniation or cord abnormality.

## 2024-10-25 ENCOUNTER — TELEPHONE (OUTPATIENT)
Facility: HOSPITAL | Age: 44
End: 2024-10-25

## 2024-10-25 NOTE — PROGRESS NOTES
Informed pt of MRI results and possible tx plan; pt requested to discuss tx options w/PCP; call transferred to PSR Doris for scheduling.

## 2024-10-25 NOTE — TELEPHONE ENCOUNTER
CALLED PT, NO GEORGE SCHD, PT STATED SHE IS STILL WAITING ON MRI RESULTS, PTS AUTH HAS EXP AS OF NOW, IF PT CALLS BACK WE NEED TO DO A DATE EXT

## 2024-11-12 DIAGNOSIS — R00.0 TACHYCARDIA: ICD-10-CM

## 2024-11-12 DIAGNOSIS — I10 ESSENTIAL (PRIMARY) HYPERTENSION: ICD-10-CM

## 2024-11-12 RX ORDER — METOPROLOL SUCCINATE 50 MG/1
50 TABLET, EXTENDED RELEASE ORAL DAILY
Qty: 90 TABLET | Refills: 1 | Status: SHIPPED | OUTPATIENT
Start: 2024-11-12

## 2024-11-12 NOTE — TELEPHONE ENCOUNTER
Pt called requesting a refill to be sent to the [facility] on file.    Requested Prescriptions     Pending Prescriptions Disp Refills    metoprolol succinate (TOPROL XL) 50 MG extended release tablet 90 tablet 1     Sig: Take 1 tablet by mouth daily

## 2025-01-05 DIAGNOSIS — F41.0 PANIC DISORDER (EPISODIC PAROXYSMAL ANXIETY): ICD-10-CM

## 2025-01-05 DIAGNOSIS — N39.0 RECURRENT UTI: ICD-10-CM

## 2025-01-05 DIAGNOSIS — F41.1 GENERALIZED ANXIETY DISORDER: ICD-10-CM

## 2025-01-06 RX ORDER — NITROFURANTOIN 25; 75 MG/1; MG/1
CAPSULE ORAL
Qty: 30 CAPSULE | Refills: 1 | Status: SHIPPED | OUTPATIENT
Start: 2025-01-06

## 2025-01-06 RX ORDER — VALACYCLOVIR HYDROCHLORIDE 1 G/1
1000 TABLET, FILM COATED ORAL 3 TIMES DAILY
Qty: 21 TABLET | Refills: 0 | Status: SHIPPED | OUTPATIENT
Start: 2025-01-06 | End: 2025-01-13

## 2025-01-06 RX ORDER — LORAZEPAM 0.5 MG/1
0.5 TABLET ORAL 2 TIMES DAILY PRN
Qty: 30 TABLET | Refills: 0 | Status: SHIPPED | OUTPATIENT
Start: 2025-01-06 | End: 2025-02-05

## 2025-01-11 SDOH — ECONOMIC STABILITY: FOOD INSECURITY: WITHIN THE PAST 12 MONTHS, THE FOOD YOU BOUGHT JUST DIDN'T LAST AND YOU DIDN'T HAVE MONEY TO GET MORE.: NEVER TRUE

## 2025-01-11 SDOH — ECONOMIC STABILITY: INCOME INSECURITY: IN THE LAST 12 MONTHS, WAS THERE A TIME WHEN YOU WERE NOT ABLE TO PAY THE MORTGAGE OR RENT ON TIME?: NO

## 2025-01-11 SDOH — ECONOMIC STABILITY: TRANSPORTATION INSECURITY
IN THE PAST 12 MONTHS, HAS THE LACK OF TRANSPORTATION KEPT YOU FROM MEDICAL APPOINTMENTS OR FROM GETTING MEDICATIONS?: NO

## 2025-01-11 SDOH — ECONOMIC STABILITY: TRANSPORTATION INSECURITY
IN THE PAST 12 MONTHS, HAS LACK OF TRANSPORTATION KEPT YOU FROM MEETINGS, WORK, OR FROM GETTING THINGS NEEDED FOR DAILY LIVING?: NO

## 2025-01-11 SDOH — ECONOMIC STABILITY: FOOD INSECURITY: WITHIN THE PAST 12 MONTHS, YOU WORRIED THAT YOUR FOOD WOULD RUN OUT BEFORE YOU GOT MONEY TO BUY MORE.: NEVER TRUE

## 2025-01-14 ENCOUNTER — TELEMEDICINE (OUTPATIENT)
Dept: FAMILY MEDICINE CLINIC | Facility: CLINIC | Age: 45
End: 2025-01-14
Payer: MEDICAID

## 2025-01-14 DIAGNOSIS — F41.0 GENERALIZED ANXIETY DISORDER WITH PANIC ATTACKS: Primary | ICD-10-CM

## 2025-01-14 DIAGNOSIS — F43.22 ADJUSTMENT DISORDER WITH ANXIOUS MOOD: ICD-10-CM

## 2025-01-14 DIAGNOSIS — M54.2 CERVICALGIA: ICD-10-CM

## 2025-01-14 DIAGNOSIS — F41.1 GENERALIZED ANXIETY DISORDER WITH PANIC ATTACKS: Primary | ICD-10-CM

## 2025-01-14 PROCEDURE — 99214 OFFICE O/P EST MOD 30 MIN: CPT | Performed by: STUDENT IN AN ORGANIZED HEALTH CARE EDUCATION/TRAINING PROGRAM

## 2025-01-14 RX ORDER — LORAZEPAM 0.5 MG/1
0.5 TABLET ORAL 2 TIMES DAILY PRN
Qty: 60 TABLET | Refills: 0 | Status: SHIPPED | OUTPATIENT
Start: 2025-01-14 | End: 2025-02-13

## 2025-01-14 ASSESSMENT — PATIENT HEALTH QUESTIONNAIRE - PHQ9
7. TROUBLE CONCENTRATING ON THINGS, SUCH AS READING THE NEWSPAPER OR WATCHING TELEVISION: MORE THAN HALF THE DAYS
10. IF YOU CHECKED OFF ANY PROBLEMS, HOW DIFFICULT HAVE THESE PROBLEMS MADE IT FOR YOU TO DO YOUR WORK, TAKE CARE OF THINGS AT HOME, OR GET ALONG WITH OTHER PEOPLE: SOMEWHAT DIFFICULT
2. FEELING DOWN, DEPRESSED OR HOPELESS: SEVERAL DAYS
SUM OF ALL RESPONSES TO PHQ QUESTIONS 1-9: 9
SUM OF ALL RESPONSES TO PHQ QUESTIONS 1-9: 9
3. TROUBLE FALLING OR STAYING ASLEEP: SEVERAL DAYS
6. FEELING BAD ABOUT YOURSELF - OR THAT YOU ARE A FAILURE OR HAVE LET YOURSELF OR YOUR FAMILY DOWN: SEVERAL DAYS
SUM OF ALL RESPONSES TO PHQ QUESTIONS 1-9: 9
SUM OF ALL RESPONSES TO PHQ9 QUESTIONS 1 & 2: 2
SUM OF ALL RESPONSES TO PHQ QUESTIONS 1-9: 9
9. THOUGHTS THAT YOU WOULD BE BETTER OFF DEAD, OR OF HURTING YOURSELF: NOT AT ALL
8. MOVING OR SPEAKING SO SLOWLY THAT OTHER PEOPLE COULD HAVE NOTICED. OR THE OPPOSITE, BEING SO FIGETY OR RESTLESS THAT YOU HAVE BEEN MOVING AROUND A LOT MORE THAN USUAL: NOT AT ALL
5. POOR APPETITE OR OVEREATING: SEVERAL DAYS
4. FEELING TIRED OR HAVING LITTLE ENERGY: MORE THAN HALF THE DAYS
1. LITTLE INTEREST OR PLEASURE IN DOING THINGS: SEVERAL DAYS

## 2025-01-14 ASSESSMENT — ANXIETY QUESTIONNAIRES
3. WORRYING TOO MUCH ABOUT DIFFERENT THINGS: SEVERAL DAYS
1. FEELING NERVOUS, ANXIOUS, OR ON EDGE: NEARLY EVERY DAY
2. NOT BEING ABLE TO STOP OR CONTROL WORRYING: SEVERAL DAYS
IF YOU CHECKED OFF ANY PROBLEMS ON THIS QUESTIONNAIRE, HOW DIFFICULT HAVE THESE PROBLEMS MADE IT FOR YOU TO DO YOUR WORK, TAKE CARE OF THINGS AT HOME, OR GET ALONG WITH OTHER PEOPLE: VERY DIFFICULT
GAD7 TOTAL SCORE: 7
5. BEING SO RESTLESS THAT IT IS HARD TO SIT STILL: NOT AT ALL
7. FEELING AFRAID AS IF SOMETHING AWFUL MIGHT HAPPEN: NOT AT ALL
6. BECOMING EASILY ANNOYED OR IRRITABLE: NOT AT ALL
4. TROUBLE RELAXING: MORE THAN HALF THE DAYS

## 2025-01-14 ASSESSMENT — ENCOUNTER SYMPTOMS
CONSTIPATION: 0
NAUSEA: 0
BACK PAIN: 0
ABDOMINAL PAIN: 0
EYE PAIN: 0
VOMITING: 0
SORE THROAT: 0
CHEST TIGHTNESS: 0
DIARRHEA: 0
RHINORRHEA: 0
SHORTNESS OF BREATH: 0
COUGH: 0

## 2025-01-14 NOTE — PROGRESS NOTES
Imelda Heredia is a 44 y.o. female (: 1980) presenting to address:    Chief Complaint   Patient presents with    Anxiety     Form completed       There were no vitals filed for this visit.    \"Have you been to the ER, urgent care clinic since your last visit?  Hospitalized since your last visit?\"    NO    “Have you seen or consulted any other health care providers outside of [facility] since your last visit?”    NO       Have you had a mammogram?”   YES - Where: 2 months ago on [facility] to request most recent records if not in the chart    No breast cancer screening on file           
prevent UTI as discussed, Disp: 30 capsule, Rfl: 1    valACYclovir (VALTREX) 1 g tablet, Take 1 tablet by mouth 3 times daily for 7 days, Disp: 21 tablet, Rfl: 0    metoprolol succinate (TOPROL XL) 50 MG extended release tablet, Take 1 tablet by mouth daily, Disp: 90 tablet, Rfl: 1    triamcinolone (KENALOG) 0.1 % cream, Apply topically 2 times daily to armpits for at least 7 days, Disp: 15 g, Rfl: 1    atorvastatin (LIPITOR) 40 MG tablet, Take 1 tablet by mouth daily, Disp: 90 tablet, Rfl: 3    Omega-3 Fatty Acids (FISH OIL PO), Take by mouth, Disp: , Rfl:     Drospirenone (SLYND) 4 MG TABS, Take 4 mg by mouth daily, Disp: , Rfl:     ibuprofen (ADVIL;MOTRIN) 200 MG CAPS capsule, Take by mouth as needed, Disp: , Rfl:     diclofenac (VOLTAREN) 75 MG EC tablet, Take 1 tablet by mouth 2 times daily, Disp: 60 tablet, Rfl: 3    valACYclovir (VALTREX) 500 MG tablet, Take 4 tablets  12 hours apart today, then 4 tablets 12 hours apart at the first sign of fever blisters in the future (Patient not taking: Reported on 1/14/2025), Disp: , Rfl:     Review of Systems   Constitutional:  Negative for appetite change, chills, fatigue, fever and unexpected weight change.   HENT:  Negative for congestion, rhinorrhea and sore throat.    Eyes:  Negative for pain.   Respiratory:  Negative for cough, chest tightness and shortness of breath.    Cardiovascular:  Positive for palpitations. Negative for chest pain and leg swelling.   Gastrointestinal:  Negative for abdominal pain, constipation, diarrhea, nausea and vomiting.   Genitourinary:  Negative for dysuria and frequency.   Musculoskeletal:  Negative for arthralgias, back pain and myalgias.   Skin:  Negative for rash and wound.   Neurological:  Negative for dizziness and weakness.   Psychiatric/Behavioral:  Positive for sleep disturbance. The patient is nervous/anxious.      There were no vitals taken for this visit.    Physical Exam  Constitutional:       General: She is not in acute

## 2025-01-21 ENCOUNTER — TELEPHONE (OUTPATIENT)
Dept: FAMILY MEDICINE CLINIC | Facility: CLINIC | Age: 45
End: 2025-01-21

## 2025-01-21 DIAGNOSIS — F43.22 ADJUSTMENT DISORDER WITH ANXIOUS MOOD: ICD-10-CM

## 2025-01-21 DIAGNOSIS — F41.1 GENERALIZED ANXIETY DISORDER WITH PANIC ATTACKS: Primary | ICD-10-CM

## 2025-01-21 DIAGNOSIS — F41.0 GENERALIZED ANXIETY DISORDER WITH PANIC ATTACKS: Primary | ICD-10-CM

## 2025-01-21 RX ORDER — PAROXETINE 10 MG/1
10 TABLET, FILM COATED ORAL DAILY
Qty: 30 TABLET | Refills: 3 | Status: SHIPPED | OUTPATIENT
Start: 2025-01-21

## 2025-01-21 NOTE — TELEPHONE ENCOUNTER
Pt called stating that she spoke with Dr. Sung about anxiety medication. Pt would like to be  prescribed Paxil at a very low dosage to start. Patient stated that her insurance is getting ready to run out so instead of the swab test she will give Paxil a try. Pt would like to confirm that other meds would not have drug interactions

## 2025-02-12 DIAGNOSIS — F41.1 GENERALIZED ANXIETY DISORDER WITH PANIC ATTACKS: ICD-10-CM

## 2025-02-12 DIAGNOSIS — F43.22 ADJUSTMENT DISORDER WITH ANXIOUS MOOD: ICD-10-CM

## 2025-02-12 DIAGNOSIS — F41.0 GENERALIZED ANXIETY DISORDER WITH PANIC ATTACKS: ICD-10-CM

## 2025-02-12 DIAGNOSIS — F41.0 PANIC DISORDER (EPISODIC PAROXYSMAL ANXIETY): ICD-10-CM

## 2025-02-13 RX ORDER — LORAZEPAM 0.5 MG/1
0.5 TABLET ORAL 2 TIMES DAILY PRN
Qty: 60 TABLET | Refills: 2 | Status: SHIPPED | OUTPATIENT
Start: 2025-02-13 | End: 2025-05-14

## 2025-02-13 RX ORDER — VALACYCLOVIR HYDROCHLORIDE 1 G/1
1000 TABLET, FILM COATED ORAL 3 TIMES DAILY
Qty: 21 TABLET | Refills: 0 | Status: SHIPPED | OUTPATIENT
Start: 2025-02-13 | End: 2025-02-20

## 2025-02-24 ENCOUNTER — TELEPHONE (OUTPATIENT)
Facility: HOSPITAL | Age: 45
End: 2025-02-24

## 2025-02-24 NOTE — TELEPHONE ENCOUNTER
I called  to remind her of her upcoming appointment. She did not answer, so I left her a message with a reminder of her appointment date, time, and check in time.

## 2025-03-03 ENCOUNTER — TELEPHONE (OUTPATIENT)
Facility: HOSPITAL | Age: 45
End: 2025-03-03

## 2025-05-08 DIAGNOSIS — R00.0 TACHYCARDIA: ICD-10-CM

## 2025-05-08 DIAGNOSIS — I10 ESSENTIAL (PRIMARY) HYPERTENSION: ICD-10-CM

## 2025-05-08 RX ORDER — METOPROLOL SUCCINATE 50 MG/1
50 TABLET, EXTENDED RELEASE ORAL DAILY
Qty: 90 TABLET | Refills: 1 | Status: SHIPPED | OUTPATIENT
Start: 2025-05-08

## 2025-05-18 DIAGNOSIS — F41.0 GENERALIZED ANXIETY DISORDER WITH PANIC ATTACKS: ICD-10-CM

## 2025-05-18 DIAGNOSIS — F41.1 GENERALIZED ANXIETY DISORDER WITH PANIC ATTACKS: ICD-10-CM

## 2025-05-18 DIAGNOSIS — F43.22 ADJUSTMENT DISORDER WITH ANXIOUS MOOD: ICD-10-CM

## 2025-05-19 DIAGNOSIS — E78.1 HYPERTRIGLYCERIDEMIA: ICD-10-CM

## 2025-05-19 RX ORDER — LORAZEPAM 0.5 MG/1
0.5 TABLET ORAL 2 TIMES DAILY PRN
Qty: 60 TABLET | Refills: 0 | Status: SHIPPED | OUTPATIENT
Start: 2025-05-19 | End: 2025-08-17

## 2025-05-20 RX ORDER — ATORVASTATIN CALCIUM 40 MG/1
40 TABLET, FILM COATED ORAL DAILY
Qty: 90 TABLET | Refills: 3 | Status: SHIPPED | OUTPATIENT
Start: 2025-05-20